# Patient Record
Sex: MALE | Race: BLACK OR AFRICAN AMERICAN | Employment: OTHER | URBAN - METROPOLITAN AREA
[De-identification: names, ages, dates, MRNs, and addresses within clinical notes are randomized per-mention and may not be internally consistent; named-entity substitution may affect disease eponyms.]

---

## 2019-06-13 ENCOUNTER — OFFICE VISIT (OUTPATIENT)
Dept: NEUROLOGY | Age: 44
End: 2019-06-13

## 2019-06-13 VITALS
DIASTOLIC BLOOD PRESSURE: 90 MMHG | OXYGEN SATURATION: 96 % | WEIGHT: 227.6 LBS | HEART RATE: 91 BPM | SYSTOLIC BLOOD PRESSURE: 132 MMHG | HEIGHT: 76 IN | BODY MASS INDEX: 27.72 KG/M2 | TEMPERATURE: 99.1 F | RESPIRATION RATE: 22 BRPM

## 2019-06-13 DIAGNOSIS — G93.49 ENCEPHALOPATHY CHRONIC: Primary | ICD-10-CM

## 2019-06-13 RX ORDER — ERGOCALCIFEROL 1.25 MG/1
CAPSULE ORAL
COMMUNITY
Start: 2019-05-30

## 2019-06-13 RX ORDER — AMLODIPINE BESYLATE 5 MG/1
TABLET ORAL
COMMUNITY
Start: 2019-05-29

## 2019-06-13 RX ORDER — BUPROPION HYDROCHLORIDE 300 MG/1
TABLET ORAL
COMMUNITY
Start: 2019-06-12 | End: 2021-07-14

## 2019-06-13 RX ORDER — ATORVASTATIN CALCIUM 10 MG/1
TABLET, FILM COATED ORAL
COMMUNITY
Start: 2019-05-29

## 2019-06-13 NOTE — LETTER
6/13/19 Patient: Mainor Acevedo YOB: 1975 Date of Visit: 6/13/2019 Ethel Gaines MD 
2020 [address] [facility] [address] [place] [address] VIA Facsimile: [phone] Dear Ethel Gaines MD, Thank you for referring Mr. Mainor Aceevdo to [facility] for evaluation. My notes for this consultation are attached. If you have questions, please do not hesitate to call me. I look forward to following your patient along with you.  
 
 
Sincerely, 
 
Yari Ba MD

## 2019-06-13 NOTE — PROGRESS NOTES
Diego Serna is a 53 y.o. male new patient in today with spouse to discuss encephalitis; referred by Angela Hinkle MD.

## 2019-06-17 ENCOUNTER — TELEPHONE (OUTPATIENT)
Dept: NEUROLOGY | Age: 44
End: 2019-06-17

## 2019-06-17 DIAGNOSIS — F40.240 CLAUSTROPHOBIA: Primary | ICD-10-CM

## 2019-06-17 RX ORDER — ALPRAZOLAM 0.5 MG/1
0.5 TABLET ORAL 2 TIMES DAILY
Qty: 6 TAB | Refills: 0 | Status: SHIPPED | OUTPATIENT
Start: 2019-06-17 | End: 2020-09-01

## 2019-06-17 NOTE — TELEPHONE ENCOUNTER
Pt spouse request anxiety medication due to patients fear of closed in areas. Pt spouse advised that script would have to be picked up from office to take to pharmacy. Please advise.

## 2019-06-17 NOTE — TELEPHONE ENCOUNTER
Pt spouse returned call to office wstating that she is going to cancel MRI. States that pt is having a \"fit\".  Please advise as needed

## 2019-07-12 ENCOUNTER — HOSPITAL ENCOUNTER (OUTPATIENT)
Dept: MRI IMAGING | Age: 44
Discharge: HOME | End: 2019-07-12
Attending: PSYCHIATRY & NEUROLOGY
Payer: COMMERCIAL

## 2019-07-12 VITALS — BODY MASS INDEX: 26.78 KG/M2 | WEIGHT: 220 LBS

## 2019-07-12 DIAGNOSIS — G93.49 ENCEPHALOPATHY CHRONIC: ICD-10-CM

## 2019-07-12 LAB — CREAT UR-MCNC: 1 MG/DL (ref 0.6–1.3)

## 2019-07-12 PROCEDURE — 74011636320 HC RX REV CODE- 636/320: Performed by: PSYCHIATRY & NEUROLOGY

## 2019-07-12 PROCEDURE — A9575 INJ GADOTERATE MEGLUMI 0.1ML: HCPCS | Performed by: PSYCHIATRY & NEUROLOGY

## 2019-07-12 PROCEDURE — 82565 ASSAY OF CREATININE: CPT

## 2019-07-12 PROCEDURE — 70553 MRI BRAIN STEM W/O & W/DYE: CPT

## 2019-07-12 RX ADMIN — GADOTERATE MEGLUMINE 20 ML: 376.9 INJECTION INTRAVENOUS at 12:23

## 2019-07-30 ENCOUNTER — HOSPITAL ENCOUNTER (OUTPATIENT)
Dept: NEUROLOGY | Age: 44
Discharge: HOME | End: 2019-07-30
Attending: PSYCHIATRY & NEUROLOGY
Payer: COMMERCIAL

## 2019-07-30 DIAGNOSIS — G93.49 ENCEPHALOPATHY CHRONIC: ICD-10-CM

## 2019-07-30 PROCEDURE — 95819 EEG AWAKE AND ASLEEP: CPT

## 2019-08-03 NOTE — PROCEDURES
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 He apparently was hospitalized for a month at that time. MEDICATIONS:  Currently Include Wellbutrin, Norvasc, Lipitor, Fioricet, vitamin D.    EEG REPORT:  The predominant apparently wakeful background consists of 5-15 microvolts, sinusoidal and symmetrical, 9-10 Hz waves which attenuate well with eye opening. Bifrontal 3-5 microvolts, 16-20 Hz waves are seen, which are symmetrical in their occurrence. Step flash photic stimulation was performed that caused driving between 3 and 18 flashes per second. Hyperventilation did not change the recording. IMPRESSION:  This is a normal wakeful EEG. No epileptiform or focal abnormality was identified.         MD LEYDA [id number]  D:  2019 15:02  T:  2019 21:34  JOB #:  [id number]

## 2019-12-23 ENCOUNTER — TELEPHONE (OUTPATIENT)
Dept: NEUROLOGY | Age: 44
End: 2019-12-23

## 2019-12-23 DIAGNOSIS — F02.80 ALZHEIMER'S DEMENTIA WITHOUT BEHAVIORAL DISTURBANCE, UNSPECIFIED TIMING OF DEMENTIA ONSET: Primary | ICD-10-CM

## 2019-12-23 DIAGNOSIS — G30.9 ALZHEIMER'S DEMENTIA WITHOUT BEHAVIORAL DISTURBANCE, UNSPECIFIED TIMING OF DEMENTIA ONSET: Primary | ICD-10-CM

## 2019-12-23 NOTE — TELEPHONE ENCOUNTER
Pt's wife called to schedule an appt with Dr. Altman . However, referral generated by Dr. Amanda Perla has been closed.  Please create new referral.
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
## 2020-02-27 ENCOUNTER — OFFICE VISIT (OUTPATIENT)
Dept: NEUROLOGY | Age: 45
End: 2020-02-27

## 2020-02-27 DIAGNOSIS — F81.9 LEARNING DIFFICULTY DUE TO COGNITIVE LIMITATIONS: ICD-10-CM

## 2020-02-27 DIAGNOSIS — R47.89 WORD FINDING DIFFICULTY: ICD-10-CM

## 2020-02-27 DIAGNOSIS — R41.9 DEFICIT IN COMPREHENSION: ICD-10-CM

## 2020-02-27 DIAGNOSIS — R41.840 ATTENTION AND CONCENTRATION DEFICIT: ICD-10-CM

## 2020-02-27 DIAGNOSIS — R41.844 EXECUTIVE FUNCTION DEFICIT: ICD-10-CM

## 2020-02-27 DIAGNOSIS — R41.3 MEMORY LOSS: Primary | ICD-10-CM

## 2020-03-13 ENCOUNTER — OFFICE VISIT (OUTPATIENT)
Dept: NEUROLOGY | Age: 45
End: 2020-03-13

## 2020-03-13 DIAGNOSIS — F43.22 ADJUSTMENT DISORDER WITH ANXIOUS MOOD: ICD-10-CM

## 2020-03-13 DIAGNOSIS — G31.84 MILD NEUROCOGNITIVE DISORDER: Primary | ICD-10-CM
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
## 2020-03-26 ENCOUNTER — OFFICE VISIT (OUTPATIENT)
Dept: NEUROLOGY | Age: 45
End: 2020-03-26

## 2020-03-26 DIAGNOSIS — G31.84 MILD NEUROCOGNITIVE DISORDER: Primary | ICD-10-CM

## 2020-03-26 DIAGNOSIS — F43.22 ADJUSTMENT DISORDER WITH ANXIOUS MOOD: ICD-10-CM

## 2020-03-26 NOTE — PROGRESS NOTES
Interactive Psychotherapy/office feedback        Interactive office feedback session with Mr. Karen George and his wife. I reviewed the results of the recent Neuropsychological Evaluation  including the observed areas of neurocognitive strengths and weaknesses. Education was provided regarding my diagnostic impressions, and treatment plan/options were discussed. I also answered numerous questions related to the clinical findings, including the various methods to improve cognition and mood. CBT, psychoeducation, and supportive psychotherapy techniques were utilized. Referrals were provided (Dr. Farhan Ardon). Prior to seeing the patient I reviewed the records, including the previously completed report, the records in [place], and any updated visits from other providers since I saw the patient last.       Diagnoses:      1. MCI  2. Adjustment Disorder, with anxious features                The patient will follow up with the referring provider, and reported being very pleased with the services provided. Follow up with [facility] prn. [id number] psychotherapy with patient and wife. 45 minutes       This note will not be viewable in "[employer]t. This note was created using voice recognition software. Despite editing, there may be syntax errors.

## 2020-04-02 ENCOUNTER — VIRTUAL VISIT (OUTPATIENT)
Dept: NEUROLOGY | Age: 45
End: 2020-04-02

## 2020-04-02 VITALS — BODY MASS INDEX: 29.22 KG/M2 | HEIGHT: 76 IN | WEIGHT: 240 LBS

## 2020-04-02 DIAGNOSIS — R41.3 MEMORY LOSS: ICD-10-CM

## 2020-04-02 DIAGNOSIS — G93.49 ENCEPHALOPATHY CHRONIC: ICD-10-CM

## 2020-04-02 DIAGNOSIS — G30.9 ALZHEIMER'S DEMENTIA WITHOUT BEHAVIORAL DISTURBANCE, UNSPECIFIED TIMING OF DEMENTIA ONSET: ICD-10-CM

## 2020-04-02 DIAGNOSIS — F43.22 ADJUSTMENT DISORDER WITH ANXIOUS MOOD: ICD-10-CM

## 2020-04-02 DIAGNOSIS — F02.80 ALZHEIMER'S DEMENTIA WITHOUT BEHAVIORAL DISTURBANCE, UNSPECIFIED TIMING OF DEMENTIA ONSET: ICD-10-CM

## 2020-04-02 DIAGNOSIS — G31.84 MILD NEUROCOGNITIVE DISORDER: Primary | ICD-10-CM

## 2020-04-02 RX ORDER — DIVALPROEX SODIUM 500 MG/1
TABLET, EXTENDED RELEASE ORAL
COMMUNITY
Start: 2020-03-28 | End: 2021-07-14

## 2020-04-02 RX ORDER — DONEPEZIL HYDROCHLORIDE 5 MG/1
5 TABLET, FILM COATED ORAL
Qty: 30 TAB | Refills: 3 | Status: SHIPPED | OUTPATIENT
Start: 2020-04-02 | End: 2020-07-09

## 2020-04-02 NOTE — PROGRESS NOTES
Re:  Henna Shock up visit     4/2/2020 10:31 AM    SSN: [id number]    Subjective:   Roxanna Powers is seen virtually for follow up of his cognitive problems after an episode of encephalitis. Medications:    Current Outpatient Medications   Medication Sig Dispense Refill    divalproex ER (DEPAKOTE ER) 500 mg ER tablet       buPROPion XL (WELLBUTRIN XL) 300 mg XL tablet       amLODIPine (NORVASC) 5 mg tablet       atorvastatin (LIPITOR) 10 mg tablet       ergocalciferol (ERGOCALCIFEROL) 50,000 unit capsule       ALPRAZolam (XANAX) 0.5 mg tablet Take 1 Tab by mouth two (2) times a day. Max Daily Amount: 1 mg. Start 2 days prior to MRI 6 Tab 0    butalbital-acetaminophen-caffeine (FIORICET) -40 mg per tablet Take 1 Tab by mouth every six (6) hours as needed for Headache. 16 Tab 0       Vital signs:    Visit Vitals  Ht 6' 4\" (1.93 m)   Wt 108.9 kg (240 lb)   BMI 29.21 kg/m²       Review of Systems:   As above otherwise 11 point review of systems negative including;   Constitutional no fever or chills  Skin denies rash or itching  HEENT  Denies tinnitus, hearing lose  Eyes denies diplopia vision lose  Respiratory denies sortness of breath  Cardiovascular denies chest pain, dyspnea on exertion  Gastrointestinal denies nausea, vomiting, diarrhea, constipation  Genitourinary denies incontinence  Musculoskeletal denies joint pain or swelling  Endocrine denies weight change  Hematology denies easy bruising or bleeding   Neurological as above in HPI      There is no problem list on file for this patient. Objective: The patient is awake, alert, and oriented x 4. Fund of knowledge is adequate. Speech is fluent and memory is intact. Cranial Nerves: II  Visual fields are full to confrontation. III, IV, VI  Extraocular movements are intact. There is no nystagmus. V  Facial sensation is intact to pinprick. VII  Face is symmetrical.  VIII - Hearing is present.   IX, X, XII  Palate is symmetrical.   XI - Shoulder shrugging and head turning intact  Motor: The patient moves all four limbs fairly well and symmetrically. Tone is normal. Reflexes are 2+ and symmetrical. Plantars are down going. Gait is normal.    CBC:   Lab Results   Component Value Date/Time    WBC 4.8 12/14/2015 11:35 PM    RBC 4.59 (L) 12/14/2015 11:35 PM    HGB 14.2 12/14/2015 11:35 PM    HCT 42.8 12/14/2015 11:35 PM    PLATELET 891 39/76/3846 11:35 PM     BMP:   Lab Results   Component Value Date/Time    Glucose 109 (H) 12/14/2015 11:35 PM    Sodium 141 12/14/2015 11:35 PM    Potassium 3.7 12/14/2015 11:35 PM    Chloride 105 12/14/2015 11:35 PM    CO2 25 12/14/2015 11:35 PM    BUN 15 12/14/2015 11:35 PM    Creatinine 1.00 12/14/2015 11:35 PM    Calcium 8.4 (L) 12/14/2015 11:35 PM     CMP:   Lab Results   Component Value Date/Time    Glucose 109 (H) 12/14/2015 11:35 PM    Sodium 141 12/14/2015 11:35 PM    Potassium 3.7 12/14/2015 11:35 PM    Chloride 105 12/14/2015 11:35 PM    CO2 25 12/14/2015 11:35 PM    BUN 15 12/14/2015 11:35 PM    Creatinine 1.00 12/14/2015 11:35 PM    Calcium 8.4 (L) 12/14/2015 11:35 PM    Anion gap 11 12/14/2015 11:35 PM    BUN/Creatinine ratio 15 12/14/2015 11:35 PM     Coagulation: No results found for: PTP, INR, APTT, PTTT, INREXT  Cardiac markers: No results found for: CPK, CKND1, MELISA    Assessment:  Cognitive decline in this man who has risk factors including episode of sever encephilitis. The neuro psych testing points toward a non-specific cognitive decline, etiology almost certainly his encephalitis. Plan: Will start him on low dose Aricept. RTC in 3 weeks    Sincerely,        Katherine Damian. Devora Carson M.D. Consent: Hong Phillips, who was seen by synchronous (real-time) audio-video technology, and/or his healthcare decision maker, is aware that this patient-initiated, Telehealth encounter on 4/2/2020 is a billable service, with coverage as determined by his insurance carrier.  He is aware that he may receive a bill and has provided verbal consent to proceed: Yes. I spent at least 25 minutes with this established patient, and >50% of the time was spent counseling and/or coordinating care regarding results of neuropsych testing and continued trouble with cognitive problems. drug therapy. 712  Subjective:   Patsy Johns is a 50 y.o. male who was seen for Referral Follow Up (Dr. Sujatha Gaspar)    We discussed the expected course, resolution and complications of the diagnosis(es) in detail. Medication risks, benefits, costs, interactions, and alternatives were discussed as indicated. I advised him to contact the office if his condition worsens, changes or fails to improve as anticipated. He expressed understanding with the diagnosis(es) and plan. Patsy Johns is a 50 y.o. male being evaluated by a video visit encounter for concerns as above. A caregiver was present when appropriate. Due to this being a TeleHealth encounter ([facility]), evaluation of the following organ systems was limited: Vitals/Constitutional/EENT/Resp/CV/GI//MS/Neuro/Skin/Heme-Lymph-Imm. Pursuant to the emergency declaration under the [facility] [place]note 2[place], [facility]5 waiver authority and the [employer] General Act, this Virtual  Visit was conducted, with patient's (and/or legal guardian's) consent, to reduce the patient's risk of exposure to COVID-19 and provide necessary medical care. Services were provided through a video synchronous discussion virtually to substitute for in-person clinic visit. Patient and provider were located at their individual homes.         Rhonda Charles MD

## 2020-07-09 ENCOUNTER — VIRTUAL VISIT (OUTPATIENT)
Dept: NEUROLOGY | Age: 45
End: 2020-07-09

## 2020-07-09 DIAGNOSIS — G93.49 ENCEPHALOPATHY CHRONIC: ICD-10-CM

## 2020-07-09 DIAGNOSIS — R26.9 GAIT DISORDER: ICD-10-CM

## 2020-07-09 DIAGNOSIS — R26.89 IMBALANCE: ICD-10-CM

## 2020-07-09 DIAGNOSIS — R41.3 MEMORY LOSS: Primary | ICD-10-CM

## 2020-07-09 DIAGNOSIS — G31.84 MILD NEUROCOGNITIVE DISORDER: ICD-10-CM

## 2020-07-09 RX ORDER — DONEPEZIL HYDROCHLORIDE 10 MG/1
10 TABLET, FILM COATED ORAL
Qty: 30 TAB | Refills: 5 | Status: SHIPPED | OUTPATIENT
Start: 2020-07-09 | End: 2020-09-01 | Stop reason: SDUPTHER

## 2020-07-09 NOTE — PROGRESS NOTES
Diego Archibald is a 50 y.o. male who was seen by synchronous (real-time) audio-video technology on 7/9/2020 for Neurologic Problem (mild neurocognitive disorder)        Assessment & Plan:   Diagnoses and all orders for this visit:    1. Memory loss  -     VITAMIN B12; Future  -     donepeziL (ARICEPT) 10 mg tablet; Take 1 Tab by mouth nightly. -     METABOLIC PANEL, COMPREHENSIVE; Future  -     CBC W/O DIFF; Future  -     AMMONIA; Future  -     REFERRAL TO NEUROLOGY    2. Mild neurocognitive disorder  -     VITAMIN B12; Future  -     donepeziL (ARICEPT) 10 mg tablet; Take 1 Tab by mouth nightly. -     METABOLIC PANEL, COMPREHENSIVE; Future  -     CBC W/O DIFF; Future  -     AMMONIA; Future  -     REFERRAL TO PHYSICAL THERAPY  -     REFERRAL TO NEUROLOGY    3. Encephalopathy chronic  -     METABOLIC PANEL, COMPREHENSIVE; Future  -     CBC W/O DIFF; Future  -     AMMONIA; Future  -     REFERRAL TO PHYSICAL THERAPY  -     REFERRAL TO NEUROLOGY    4. Gait disorder  -     REFERRAL TO PHYSICAL THERAPY    5. Imbalance  -     REFERRAL TO PHYSICAL THERAPY      This is a 58-year-old male who presents in follow-up for impaired cognition after an episode of encephalopathy in October 2018 in which the etiology was not entirely clear. During that episode he had empiric IV steroids and empiric IVIG with reported improvement in his mental status. There have been some improvements noted since starting Aricept. At this time will increase Aricept to 10 mg daily. Will obtain updated labs including CBC, CMP, ammonia, vitamin B12 level. Will refer for sleep evaluation due to the sleep concerns as well as cognitive complaints. Will refer to physical therapy due to his gait imbalance and mobility issues and an assistive device can be considered. He is seeing psychiatry.   Social work will be considered for interventions for community-based resources and subsidies based on recommendation after neuropsych evaluation, and they would like to do this when they get a new place. Follow up in 3 months. I spent at least 30 minutes on this visit with this established patient. Subjective:     Jocy Mckee presents in follow-up. He was initially seen here in June 2019 by Dr. Erick Moritz for follow-up of encephalitis suffered in October 2018. It was noted that he was hospitalized for 1 month in [place]. It was noted that he became confused and was found unconscious at the time of admission. It was noted in the history that there was no definitive organism told to his wife, but he was diagnosed on the basis of a spinal tap. The tap was benign except showing an elevated protein of 227. The numerous CSF studies were reported to be normal.  There were no notes seen at that time. He was in the hospital for a month. After discharge from the hospital she is noted him to be very psychomotor slow. He is slow to respond and does not appear to be caring for himself like brushing his teeth, showering, cooking, etc.  This is a dramatic decrease in his functional capacity since the incident back in 2018. He is unable to travel on the bus alone. He asked like a child at times. There was an MRI from October 2018 which reported residual of remote hemorrhagic infarct left anterior basal ganglia as well as mild to moderate generalized volume loss and moderate burden of nonspecific supratentorial white matter changes. It was noted that he did fairly well on the MMSE at his initial visit but appeared to be psychomotor slow. He was to have a repeat MRI and EEG and was sent for neuropsych testing. EEG in July 2019 was a normal wakeful EEG. MRI in June 2019 reported chronic left basal ganglia infarct with hemosiderin staining from associated hemorrhage, and mild to moderate amount of white matter diseas. He underwent neuropsych testing with Dr. Herbert Gibson in March 2020. He was next seen here in April 2020 by Dr. Erick Moritz.   It was indicated that he has cognitive decline with risk factor including episode of severe encephalitis and that the neuropsych testing points to a nonspecific cognitive decline, etiology almost certainly his encephalitis. He was started on a low-dose of Aricept. He presents today in follow-up via virtual video visit. He is accompanied by his wife. He started the Aricept 5 mg nightly. She believes that there is some benefit; he seems to be remembering personal hygiene a little bit better. He is still moving a little slowly. He is not able to cook for himself. He is not able to prepare food. His wife has to take time off work to make sure that he eats. He is able to do simple tasks like fixing the bed do more complex tasks in the whole room. He is given one thing to do at a time because he is not able to process more. He is not able to walk in a straight line. His balance is off. She is wondering if he needs a walker or a cane. Concerned about him going up and down the stairs and is thinking about getting a 1 level home. He had a couple of falls down the stairs since his hospitalization; they were in March of last year in January of this year. She reports that he has never had physical therapy. He has a history of low normal vitamin B12 level and per chart review in Care Everywhere he had injections. Per the neurology notes in Care Everywhere from the October 2018 admission, it was noted that this was an acute unspecified encephalopathy, suspected autoimmune. He presented on 10/1/2018 after being found down in the field. The initial concern was for possible toxic or traumatic insult on day, however persistent AMS greater than 2 weeks after presentation makes these unlikely. EEGs on 10/3/2018 and 10/10/2018 were both normal.  Repeat MRI of the brain with contrast on 10/11/2018 reported no enhancing abnormalities or other acute findings.   CSF protein elevated at 227 on 10/5/2018 raising the concern for a CNS inflammatory process such as autoimmune encephalitis. He received empiric IV steroids and IVIG treatments. He was getting vitamin B12 1000 mcg IM injections and was also started on vitamin D. Was on thiamine and low-dose lactulose. He had a low normal vitamin B12 and slightly elevated repeat ammonia but ammonia was normal on admission. It was reported on the neurology note on 10/22/2018 that he was 90% back to baseline. There were subcortical white matter abnormalities on MRI and new chronic left basal ganglia infarct. History of hypertension and smoking. Risk factors including vitamin B12 and D deficiencies and history of EtOH abuse. She reports that he has been sleepwalking, constantly getting up, not sleeping at night gets up and leaves the house and does not tell anybody. He sleeps very light. Reports no recent sleepwalking. Reports no alcohol or drug use currently except smokes a black and mild every now and then. Last vitamin B12 level was 250 on 10/4/2018. He is taking vitamin D. Was taking a multivitamin but not in a while. TSH was normal in October 2018 and they were told it was normal last year. Denies any unilateral weakness. Reports he has had incontinence. Reports that he went to cognitive behavioral therapy in [place]. They have a nurse practitioner in psychiatry. They went for initial visit at [facility] on July 23, previously went to another office but they are no longer there. He is on Depakote for mood swings. She reports that he has anger issues. Regarding his sleep she reports that he worries about his mother who is living at the home. Prior to Admission medications    Medication Sig Start Date End Date Taking? Authorizing Provider   donepeziL (ARICEPT) 10 mg tablet Take 1 Tab by mouth nightly.  7/9/20  Yes Eleni Doherty NP   divalproex ER (DEPAKOTE ER) 500 mg ER tablet  3/28/20  Yes Provider, Historical   atorvastatin (LIPITOR) 10 mg tablet  5/29/19  Yes Provider, Historical ALPRAZolam (XANAX) 0.5 mg tablet Take 1 Tab by mouth two (2) times a day. Max Daily Amount: 1 mg. Start 2 days prior to MRI 6/17/19   Chidi Way MD   buPROPion XL (WELLBUTRIN XL) 300 mg XL tablet  6/12/19   Provider, Historical   amLODIPine (NORVASC) 5 mg tablet  5/29/19   Provider, Historical   ergocalciferol (ERGOCALCIFEROL) 50,000 unit capsule  5/30/19   Provider, Historical   butalbital-acetaminophen-caffeine (FIORICET) -40 mg per tablet Take 1 Tab by mouth every six (6) hours as needed for Headache. 12/15/15   LYNDA Morgan     There is no problem list on file for this patient. Current Outpatient Medications   Medication Sig Dispense Refill    donepeziL (ARICEPT) 10 mg tablet Take 1 Tab by mouth nightly. 30 Tab 5    divalproex ER (DEPAKOTE ER) 500 mg ER tablet       atorvastatin (LIPITOR) 10 mg tablet       ALPRAZolam (XANAX) 0.5 mg tablet Take 1 Tab by mouth two (2) times a day. Max Daily Amount: 1 mg. Start 2 days prior to MRI 6 Tab 0    buPROPion XL (WELLBUTRIN XL) 300 mg XL tablet       amLODIPine (NORVASC) 5 mg tablet       ergocalciferol (ERGOCALCIFEROL) 50,000 unit capsule       butalbital-acetaminophen-caffeine (FIORICET) -40 mg per tablet Take 1 Tab by mouth every six (6) hours as needed for Headache. 16 Tab 0     Allergies   Allergen Reactions    Penicillin Other (comments)     HA    Pcn [Penicillins] Other (comments)     headache     No past medical history on file. No past surgical history on file. No family history on file. Social History     Tobacco Use    Smoking status: Current Some Day Smoker     Types: Cigars    Smokeless tobacco: Never Used    Tobacco comment: \"black and mild\"   Substance Use Topics    Alcohol use:  Yes     Alcohol/week: 2.0 standard drinks     Types: 2 Cans of beer per week       ROS  GENERAL: Denies fever or fatigue  CARDIAC: No CP or SOB  PULMONARY: No cough or SOB  MUSCULOSKELETAL: No new joint pain  NEURO: SEE HPI      Objective:     Patient-Reported Vitals 7/9/2020   Patient-Reported Weight 200lb   Patient-Reported Height 6'4        Constitutional: [x] Appears well-developed and well-nourished [x] No apparent distress      [] Abnormal -     Mental status: [x] Alert and awake  [] Oriented to person/place/time [] Able to follow commands    [x] Abnormal - Oriented to self, place, year as 2020, thinks month is June or July. Recalls 3/3 words at 1 min. Follows simple one-step commands. Wife provides history. Eyes:   EOM    [x]  Normal    [] Abnormal -   Sclera  []  Normal    [] Abnormal -          Discharge []  None visible   [] Abnormal -     HENT: [x] Normocephalic, atraumatic  [] Abnormal -   [] Mouth/Throat: Mucous membranes are moist    External Ears [] Normal  [] Abnormal -    Neck: [] No visualized mass [] Abnormal -     Pulmonary/Chest: [x] Respiratory effort normal   [x] No visualized signs of difficulty breathing or respiratory distress        [] Abnormal -      Musculoskeletal:   [] Normal gait with no signs of ataxia         [x] Normal range of motion of neck        [x] Abnormal - Gait as below. Neurological:        [x] No Facial Asymmetry (Cranial nerve 7 motor function) (limited exam due to video visit). Tongue is midline. Able to move all extremities antigravity. Does not participate in ankle DF or foot tapping to assess for foot drop. Gait is a little wide based, slow. [x] No gaze palsy        [] Abnormal -          Skin:        [] No significant exanthematous lesions or discoloration noted on facial skin         [] Abnormal -            Psychiatric:       [x] Normal Affect [] Abnormal -        [] No Hallucinations    Other pertinent observable physical exam findings:-        We discussed the expected course, resolution and complications of the diagnosis(es) in detail. Medication risks, benefits, costs, interactions, and alternatives were discussed as indicated.   I advised him to contact the office if his condition worsens, changes or fails to improve as anticipated. He expressed understanding with the diagnosis(es) and plan. Darlin Hunter, who was evaluated through a patient-initiated, synchronous (real-time) audio-video encounter, and/or his healthcare decision maker, is aware that it is a billable service, with coverage as determined by his insurance carrier. He provided verbal consent to proceed: Yes, and patient identification was verified. It was conducted pursuant to the emergency declaration under the [address] [place] [address], [address] [place] [address] authority and the [employer] General Act. A caregiver was present when appropriate. Ability to conduct physical exam was limited. I was in the office. The patient was at home.       Vinicio Fields NP

## 2020-07-09 NOTE — PROGRESS NOTES
Castro Sung is a 50 y.o. male patienton virtual visit today for follow-up on mild neurocognitive disorder. Visit assisted by spouse. 1. Have you been to the ER, urgent care clinic since your last visit? Hospitalized since your last visit? No    2. Have you seen or consulted any other health care providers outside of the [address] [place] [address] since your last visit? Include any pap smears or colon screening. No    Mobile number [phone] will be used for today's visit.

## 2020-09-01 ENCOUNTER — VIRTUAL VISIT (OUTPATIENT)
Dept: NEUROLOGY | Age: 45
End: 2020-09-01

## 2020-09-01 DIAGNOSIS — R26.9 GAIT DISORDER: ICD-10-CM

## 2020-09-01 DIAGNOSIS — G31.84 MILD NEUROCOGNITIVE DISORDER: Primary | ICD-10-CM

## 2020-09-01 DIAGNOSIS — G93.49 ENCEPHALOPATHY CHRONIC: ICD-10-CM

## 2020-09-01 DIAGNOSIS — R26.89 IMBALANCE: ICD-10-CM

## 2020-09-01 DIAGNOSIS — R41.3 MEMORY LOSS: ICD-10-CM

## 2020-09-01 RX ORDER — DONEPEZIL HYDROCHLORIDE 10 MG/1
10 TABLET, FILM COATED ORAL
Qty: 30 TAB | Refills: 5 | Status: SHIPPED | OUTPATIENT
Start: 2020-09-01 | End: 2021-01-29 | Stop reason: SDUPTHER

## 2020-09-01 NOTE — PROGRESS NOTES
Neville Alejandro is a 50 y.o. male who was seen by synchronous (real-time) audio-video technology on 9/1/2020 for Memory Loss (mild cognitive disorder)        Assessment & Plan:   Diagnoses and all orders for this visit:    1. Mild neurocognitive disorder  -     TSH AND FREE T4; Future    2. Memory loss    3. Encephalopathy chronic    4. Gait disorder    5. Imbalance      This is a 57-year-old male who presents in follow-up for impaired cognition after an episode of encephalopathy October 2018 in which the etiology was not entirely clear. During that episode he had empiric IV steroids and empiric IVIG with reported improvement in his mental status. He has undergone neuropsych evaluation in March 2020 with the diagnoses of mild cognitive impairment and adjustment disorder with anxious features. There has been some improvements noted since starting Aricept. They noticed benefit with the dose of 10 mg daily. Continue Aricept. Labs pending including CBC, CMP, ammonia, vitamin B12 level, and add thyroid function tests. They would like the lab slips sent to them. Sleep evaluation is pending, we will look into this referral.  He continues with psychiatry. Recommended to follow-up with PCP as well and discussed management of vascular risk factors including hypertension and hyperlipidemia. Follow up in 3 months. I spent at least 30 minutes on this visit with this established patient. Subjective:     Neville Alejandro presents in follow-up for cognitive changes after an episode of encephalopathy in October 2018. He was hospitalized for 1 month in [facility]. Since that episode there was a dramatic decrease in his functional capacity. It was noted that he appeared to be psychomotor slow. His last MRI of the brain was from June 2019 which reported chronic left basal ganglia infarct with hemosiderin staining from associated hemorrhage, and mild to moderate amount of white matter disease.   EEG in July 2019 was normal.  He underwent neuropsych testing in March 2020. It was noted that the cognitive decline was nonspecific with etiology pointing to his episode of encephalitis. He started on a low-dose of Aricept thereafter. He was last seen here on 7/9/2020. At that time it was noted that there will have been some improvements since starting Aricept. The Aricept was increased to 10 mg daily. Labs were to be obtained including CBC, CMP, ammonia, vitamin B12 level. He was referred for sleep evaluations due to the sleep concerns as well as cognitive complaints. He was referred to physical therapy due to his gait imbalance and mobility issues and an assistive device can be considered. He continues to see psychiatry. Social work was going to be considered due to recommendation after neuropsych evaluation, and they noted they would like to do this when they get a new place. He presents today in follow-up. His wife notes that the Aricept is helping. He is remembering personal hygiene better, remembering to eat at least once a day without reminders. He still has a problem she notes with his organization skills, she has to go behind him to clean up for example. There is also a problem with focus or inattention, such as when having a conversation he cannot focus on the subject matter at hand, starts talking about something else. They have been following with psychiatry. He is on the Depakote for mood swings. She believes the Aricept increased dose has been helpful. Denies side effects on the Aricept. She notes that he remembers to take his medications on his own. Denies new concerns. They had difficulty getting to physical therapy due to car problems and also his wife has an injury. He denies problems with mobility, says he walks to the store. He goes on walks daily. Denies falls. He has stopped smoking. Prior to Admission medications    Medication Sig Start Date End Date Taking?  Authorizing Provider   donepeziL (ARICEPT) 10 mg tablet Take 1 Tab by mouth nightly. 7/9/20  Yes Chi Santos NP   divalproex ER (DEPAKOTE ER) 500 mg ER tablet  3/28/20  Yes Provider, Historical   amLODIPine (NORVASC) 5 mg tablet  5/29/19  Yes Provider, Historical   atorvastatin (LIPITOR) 10 mg tablet  5/29/19  Yes Provider, Historical   ALPRAZolam (XANAX) 0.5 mg tablet Take 1 Tab by mouth two (2) times a day. Max Daily Amount: 1 mg. Start 2 days prior to MRI 6/17/19 9/1/20  Navarro Garvin MD   buPROPion XL (WELLBUTRIN XL) 300 mg XL tablet  6/12/19   Provider, Historical   ergocalciferol (ERGOCALCIFEROL) 50,000 unit capsule  5/30/19   Provider, Historical   butalbital-acetaminophen-caffeine (FIORICET) -40 mg per tablet Take 1 Tab by mouth every six (6) hours as needed for Headache. 12/15/15 9/1/20  LYNDA Chatman     There is no problem list on file for this patient. Current Outpatient Medications   Medication Sig Dispense Refill    donepeziL (ARICEPT) 10 mg tablet Take 1 Tab by mouth nightly. 30 Tab 5    divalproex ER (DEPAKOTE ER) 500 mg ER tablet       amLODIPine (NORVASC) 5 mg tablet       atorvastatin (LIPITOR) 10 mg tablet       buPROPion XL (WELLBUTRIN XL) 300 mg XL tablet       ergocalciferol (ERGOCALCIFEROL) 50,000 unit capsule        Allergies   Allergen Reactions    Penicillin Other (comments)     HA    Pcn [Penicillins] Other (comments)     headache     No past medical history on file. No past surgical history on file. No family history on file. Social History     Tobacco Use    Smoking status: Current Some Day Smoker     Types: Cigars    Smokeless tobacco: Never Used    Tobacco comment: \"black and mild\"   Substance Use Topics    Alcohol use:  Yes     Alcohol/week: 2.0 standard drinks     Types: 2 Cans of beer per week       ROS  GENERAL: Denies fever or fatigue  CARDIAC: No CP or SOB  PULMONARY: No cough or SOB  MUSCULOSKELETAL: No new joint pain  NEURO: SEE HPI      Objective: Patient-Reported Vitals 9/1/2020   Patient-Reported Weight 250lb   Patient-Reported Height -        Constitutional: [x] Appears well-developed and well-nourished [x] No apparent distress      [] Abnormal -     Mental status: [x] Alert and awake  [x] Oriented to person/place/time [x] Able to follow commands. Oriented to self, place, year as 2020, month, day of the week. Recalls 3/3 words at 1 min. [x] Abnormal - Follows simple one-step commands. Wife provides most of the history. Eyes:   EOM    [x]  Normal    [] Abnormal -   Sclera  [x]  Normal    [] Abnormal -          Discharge [x]  None visible   [] Abnormal -     HENT: [x] Normocephalic, atraumatic  [] Abnormal -   [] Mouth/Throat: Mucous membranes are moist    External Ears [] Normal  [] Abnormal -    Neck: [x] No visualized mass [] Abnormal -     Pulmonary/Chest: [x] Respiratory effort normal   [x] No visualized signs of difficulty breathing or respiratory distress        [] Abnormal -      Musculoskeletal:   [] Normal gait with no signs of ataxia         [x] Normal range of motion of neck        [x] Abnormal - Gait as below. Neurological:        [x] No Facial Asymmetry (Cranial nerve 7 motor function) (limited exam due to video visit). Tongue is midline. Able to move all extremities antigravity. Finger tapping symmetrical.        [x] No gaze palsy        [x] Abnormal - Does not participate in ankle DF or foot tapping to assess for foot drop. Gait is a little wide based, slow. Gait appears steady, less arm swing on left arm. Skin:        [x] No significant exanthematous lesions or discoloration noted on facial skin         [] Abnormal -            Psychiatric:       [x] Normal Affect [] Abnormal -        [x] No Hallucinations    Other pertinent observable physical exam findings:-        We discussed the expected course, resolution and complications of the diagnosis(es) in detail.   Medication risks, benefits, costs, interactions, and alternatives were discussed as indicated. I advised him to contact the office if his condition worsens, changes or fails to improve as anticipated. He expressed understanding with the diagnosis(es) and plan. Rima Davis, who was evaluated through a patient-initiated, synchronous (real-time) audio-video encounter, and/or his healthcare decision maker, is aware that it is a billable service, with coverage as determined by his insurance carrier. He provided verbal consent to proceed: Yes, and patient identification was verified. It was conducted pursuant to the emergency declaration under the [facility], [address] [place] [address] authority and the [employer] and [employer] General Act. A caregiver was present when appropriate. Ability to conduct physical exam was limited. I was at home. The patient was at home.       Craig Solorzano NP

## 2020-09-01 NOTE — PROGRESS NOTES
Neymar Colvin is a 50 y.o. male on virtual visit today for follow-up on memory loss and mild cognitive disorder. 1. Have you been to the ER, urgent care clinic since your last visit? Hospitalized since your last visit? No    2. Have you seen or consulted any other health care providers outside of the [address] [place] [address] since your last visit? Include any pap smears or colon screening. No    Mobile number for today's visit will be [phone].

## 2020-11-05 DIAGNOSIS — R41.3 MEMORY LOSS: ICD-10-CM

## 2020-11-05 DIAGNOSIS — G93.49 ENCEPHALOPATHY CHRONIC: ICD-10-CM

## 2020-11-05 DIAGNOSIS — G31.84 MILD NEUROCOGNITIVE DISORDER: Primary | ICD-10-CM

## 2021-01-29 ENCOUNTER — VIRTUAL VISIT (OUTPATIENT)
Dept: NEUROLOGY | Age: 46
End: 2021-01-29
Payer: COMMERCIAL

## 2021-01-29 DIAGNOSIS — R41.3 MEMORY LOSS: ICD-10-CM

## 2021-01-29 DIAGNOSIS — G31.84 MILD COGNITIVE IMPAIRMENT: Primary | ICD-10-CM

## 2021-01-29 DIAGNOSIS — G31.84 MILD NEUROCOGNITIVE DISORDER: ICD-10-CM

## 2021-01-29 DIAGNOSIS — I67.9 CEREBROVASCULAR DISEASE: ICD-10-CM

## 2021-01-29 PROCEDURE — 99214 OFFICE O/P EST MOD 30 MIN: CPT | Performed by: NURSE PRACTITIONER

## 2021-01-29 RX ORDER — DONEPEZIL HYDROCHLORIDE 10 MG/1
10 TABLET, FILM COATED ORAL
Qty: 30 TAB | Refills: 5 | Status: SHIPPED | OUTPATIENT
Start: 2021-01-29 | End: 2022-03-29

## 2021-01-29 NOTE — PROGRESS NOTES
Sonal Stockton is a 50 y.o. male who was seen by synchronous (real-time) audio-video technology on 1/29/2021 for Follow-up        Assessment & Plan:   Diagnoses and all orders for this visit:    1. Mild cognitive impairment  -     REFERRAL TO NEUROPSYCHOLOGY    2. Cerebrovascular disease    3. Memory loss  -     donepeziL (ARICEPT) 10 mg tablet; Take 1 Tab by mouth nightly. 4. Mild neurocognitive disorder  -     donepeziL (ARICEPT) 10 mg tablet; Take 1 Tab by mouth nightly. This is a 69-year-old male who presents in follow-up for mild cognitive impairment. He has impaired cognition after an episode of encephalopathy in October 18 in which the etiology was not entirely clear. During that episode he had empiric IV steroids and empiric IVIG with reported improvement in his mental status. He has undergone neuropsych evaluation in March 2020 with the diagnoses of mild cognitive impairment and adjustment disorder with anxious features. There have been some improvements noted since starting Aricept. He currently continues this at 10 mg daily. He seems to be improving over time and they note that their new living situation is helpful. He is taking Depakote for mood and follows with psychiatry. He went to therapy that was recommended after the neuropsych evaluation which they did not find beneficial.  There were labs ordered at a previous visit and we will provide the lab slip to have these done. We will repeat the neuropsych evaluation at about 1 year from prior, which will be in March. We reviewed the results of the MRI brain that was done previously and encouraged vascular risk factor management such as management of hypertension, hyperlipidemia, and smoking cessation. Follow up after neuropsych evaluation. I spent at least 35 minutes on this visit with this established patient. Subjective:     Sonal Stockton presents in follow-up for cognitive changes after an episode of encephalopathy in October 2018. He was hospitalized for 1 month in [place]. Since that episode there was a dramatic decrease in his functional capacity. It was noted that he appeared to be psychomotor slow. His last MRI of the brain was from June 2019 which reported chronic left basal ganglia infarct with hemosiderin staining from associated hemorrhage, and mild to moderate amount of white matter disease. EEG in July 2019 was normal.  He underwent neuropsych testing in March 2020. It was noted that the cognitive decline was nonspecific with etiology pointing to his episode of encephalitis. He started on a low-dose of Aricept thereafter. At the visit on 7/9/2020, it was noted that there will have been some improvements since starting Aricept. The Aricept was increased to 10 mg daily. Labs were to be obtained including CBC, CMP, ammonia, vitamin B12 level. He was referred for sleep evaluation due to the sleep concerns as well as cognitive complaints. He was referred to physical therapy due to his gait imbalance and mobility issues and an assistive device can be considered. He continues to see psychiatry. Social work was going to be considered due to recommendation after neuropsych evaluation, and they noted they would like to do this when they get a new place. He was last seen here on 9/1/2020 in virtual video visit. His wife noted that the Aricept is helping. She believes the increased dose has been helpful. He is remembering things like cleaning up better. There was some problems with focus or inattention noted. There was difficulty getting to physical therapy due to car problems, etc.  Labs were still pending at that time. Sleep evaluation was pending. He presents today in follow-up via virtual video visit. His wife reports that they moved and it has been a positive environment which has helped his condition. It is a better neighborhood, their own place.   She notes he is getting more of an initiative to do some light housework. He continues on Aricept which seems to be helping. He remembers to take his medications. He has been doing some cooking. He denies safety concerns like leaving the stove on. They deny mobility concerns. He walks to the store and back. He reports he still smokes black and milds, down to about every few days. He is not ready to quit smoking. They did not get a chance to get the labs done. He denies signs of sleep apnea such as snoring, excessive daytime fatigue, or witnessed apneic spells. He continues to see psychiatry and is on Depakote for mood. They went for therapy that was recommended after the previous neuropsych evaluation, and the therapy was in [place], and did not help, wasn't what they had in mind. Prior to Admission medications    Medication Sig Start Date End Date Taking? Authorizing Provider   donepeziL (ARICEPT) 10 mg tablet Take 1 Tab by mouth nightly. 9/1/20  Yes Albina Brown NP   divalproex ER (DEPAKOTE ER) 500 mg ER tablet  3/28/20  Yes Provider, Historical   buPROPion XL (WELLBUTRIN XL) 300 mg XL tablet  6/12/19  Yes Provider, Historical   amLODIPine (NORVASC) 5 mg tablet  5/29/19  Yes Provider, Historical   atorvastatin (LIPITOR) 10 mg tablet  5/29/19  Yes Provider, Historical   ergocalciferol (ERGOCALCIFEROL) 50,000 unit capsule  5/30/19  Yes Provider, Historical     There is no problem list on file for this patient. Current Outpatient Medications   Medication Sig Dispense Refill    donepeziL (ARICEPT) 10 mg tablet Take 1 Tab by mouth nightly. 30 Tab 5    divalproex ER (DEPAKOTE ER) 500 mg ER tablet       buPROPion XL (WELLBUTRIN XL) 300 mg XL tablet       amLODIPine (NORVASC) 5 mg tablet       atorvastatin (LIPITOR) 10 mg tablet       ergocalciferol (ERGOCALCIFEROL) 50,000 unit capsule        Allergies   Allergen Reactions    Penicillin Other (comments)     HA    Pcn [Penicillins] Other (comments)     headache     No past medical history on file.   No past surgical history on file. History reviewed. No pertinent family history. Social History     Tobacco Use    Smoking status: Current Some Day Smoker     Types: Cigars    Smokeless tobacco: Never Used    Tobacco comment: \"black and mild\"   Substance Use Topics    Alcohol use: Yes     Alcohol/week: 2.0 standard drinks     Types: 2 Cans of beer per week       ROS  GENERAL: Denies fever or fatigue  CARDIAC: No CP or SOB  PULMONARY: No cough or SOB  MUSCULOSKELETAL: No new joint pain  NEURO: SEE HPI    Objective:     Patient-Reported Vitals 1/29/2021   Patient-Reported Weight 250.0   Patient-Reported Height -        Constitutional: [x] Appears well-developed and well-nourished [x] No apparent distress      [] Abnormal -     Mental status: [x] Alert and awake  [x] Oriented to person/place/time [x] Able to follow commands. Oriented to self, place, year as 2021, month, day of the week. Recalls 3/3 words at 1 min. [] Abnormal -     Eyes:   EOM    [x]  Normal    [] Abnormal -   Sclera  [x]  Normal    [] Abnormal -          Discharge [x]  None visible   [] Abnormal -     HENT: [x] Normocephalic, atraumatic  [] Abnormal -   [] Mouth/Throat: Mucous membranes are moist    External Ears [] Normal  [] Abnormal -    Neck: [x] No visualized mass [] Abnormal -     Pulmonary/Chest: [x] Respiratory effort normal   [x] No visualized signs of difficulty breathing or respiratory distress        [] Abnormal -      Musculoskeletal:   [x] Normal gait with no signs of ataxia         [x] Normal range of motion of neck        [] Abnormal -     Neurological:        [x] No Facial Asymmetry (Cranial nerve 7 motor function) (limited exam due to video visit). Tongue is midline. Able to move all extremities antigravity. Finger tapping symmetrical. No apparent dysmetria of the BUE.  Gait appears normal.         [x] No gaze palsy        [] Abnormal -         Skin:        [x] No significant exanthematous lesions or discoloration noted on facial skin         [] Abnormal -            Psychiatric:       [x] Normal Affect [] Abnormal -        [x] No Hallucinations    Other pertinent observable physical exam findings:-        We discussed the expected course, resolution and complications of the diagnosis(es) in detail. Medication risks, benefits, costs, interactions, and alternatives were discussed as indicated. I advised him to contact the office if his condition worsens, changes or fails to improve as anticipated. He expressed understanding with the diagnosis(es) and plan. Marcie Carrillo, who was evaluated through a patient-initiated, synchronous (real-time) audio-video encounter, and/or his healthcare decision maker, is aware that it is a billable service, with coverage as determined by his insurance carrier. He provided verbal consent to proceed: Yes, and patient identification was verified. It was conducted pursuant to the emergency declaration under the [address] [place] [address], [address] [place] [address] authority and the [employer] General Act. A caregiver was present when appropriate. Ability to conduct physical exam was limited. I was at home. The patient was at home.       Flakito Brown NP

## 2021-01-29 NOTE — PROGRESS NOTES
Paula Ibarra is a 50 y.o. male who will be using a cell phone for today's VV. 1. Have you been to the ER, urgent care clinic since your last visit? Hospitalized since your last visit? No    2. Have you seen or consulted any other health care providers outside of the [facility] since your last visit? Include any pap smears or colon screening.  No     This will be the cell phone number [phone]

## 2021-07-14 ENCOUNTER — OFFICE VISIT (OUTPATIENT)
Dept: NEUROLOGY | Age: 46
End: 2021-07-14
Payer: COMMERCIAL

## 2021-07-14 VITALS
HEART RATE: 78 BPM | SYSTOLIC BLOOD PRESSURE: 134 MMHG | DIASTOLIC BLOOD PRESSURE: 80 MMHG | OXYGEN SATURATION: 98 % | BODY MASS INDEX: 29.04 KG/M2 | WEIGHT: 238.6 LBS | RESPIRATION RATE: 16 BRPM

## 2021-07-14 DIAGNOSIS — I67.9 CEREBROVASCULAR DISEASE: ICD-10-CM

## 2021-07-14 DIAGNOSIS — G31.84 MILD COGNITIVE IMPAIRMENT: Primary | ICD-10-CM

## 2021-07-14 PROCEDURE — 99215 OFFICE O/P EST HI 40 MIN: CPT | Performed by: NURSE PRACTITIONER

## 2021-07-14 RX ORDER — ASPIRIN 81 MG/1
81 TABLET ORAL DAILY
Qty: 30 TABLET | Refills: 6 | Status: SHIPPED | OUTPATIENT
Start: 2021-07-14

## 2021-07-14 RX ORDER — LACTULOSE 10 G/15ML
SOLUTION ORAL; RECTAL
COMMUNITY
Start: 2021-06-21 | End: 2022-03-29
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
## 2021-07-14 NOTE — PROGRESS NOTES
Yesica Arias is a 48 y.o. male who is in the office as a follow up. 1. Have you been to the ER, urgent care clinic since your last visit? Hospitalized since your last visit?no    2. Have you seen or consulted any other health care providers outside of the [address] [place] [address] since your last visit? Include any pap smears or colon screening.  No

## 2021-09-27 ENCOUNTER — OFFICE VISIT (OUTPATIENT)
Dept: NEUROLOGY | Age: 46
End: 2021-09-27
Payer: COMMERCIAL

## 2021-09-27 DIAGNOSIS — F41.8 ANXIETY ABOUT HEALTH: ICD-10-CM

## 2021-09-27 DIAGNOSIS — R45.4 IRRITABILITY: ICD-10-CM

## 2021-09-27 DIAGNOSIS — R41.89 COGNITIVE DECLINE: Primary | ICD-10-CM

## 2021-09-27 PROCEDURE — 90791 PSYCH DIAGNOSTIC EVALUATION: CPT | Performed by: PSYCHOLOGIST

## 2021-09-27 NOTE — PROGRESS NOTES
[facility] [address] [facility]  Neuroscience   [address] [facility], [address]  Office:  [phone]  Fax: [phone]                  Initial Office Exam  Patient Name: Loraine Dash  Age: 48 y.o. Gender: male   Handedness: right handed   Presenting Concern: cognitive decline  Primary Care Physician: Jaguar Verdin MD  Referring Provider: Brisa Jacobsen NP      REASON FOR REFERRAL:  This comprehensive and medically necessary neuropsychological assessment was requested to assist a differential diagnosis of memory complaints. The use and purpose of this examination, as well as the extent and limitations of confidentiality, were explained prior to obtaining permission to participate. Instructions were provided regarding the necessity to put forth optimal effort and answer questions truthfully in order to obtain reliable and accurate test results. REVIEW OF RECORDS:  Mr. Isi Guadalupe was referred by neurology for serial testing. His wife is raised concerns about judgment and a need for increased supervision. Aricept has been started. PT was ordered due to gait and balance and mobility issues. Mr. Isi Guadalupe is followed by psychiatry. A sleep evaluation is pending. Mr. Isi Guadalupe was seen by me in March 2020. At that time, he was being followed by neurology for encephalitis which was experienced in October 2018 and involved a 1 month hospitalization. Subsequently, Mr. Isi Guadalupe began experiencing cognitive decline, significant psychomotor slowing, and significant functional impairment (poor hygiene, inability to ride on the bus alone). Diagnostic impressions included mild cognitive impairment and adjustment disorder with anxious features. Unfortunately, diagnostic impressions were limited due to performance on the TOMM. Psychotherapy, compensatory strategies, sleep hygiene, and social work interventions were suggested.     An MRI in 6/19 showed:  1.  No acute infarct, mass effect, or herniation. 2.  Chronic left basal ganglia infarct with hemosiderin staining from associated  hemorrhage. Similar finding described on prior outside report. 3. Mild to moderate amount of white matter disease, grossly stable in comparison  to 12/14/2015 CT head, and similar to description on prior outside report. This  white matter disease is nonspecific and may represent chronic small vessel  ischemic changes, especially if there is history of severe hypertension and/or  diabetes, although distribution is mildly atypical. Differential diagnosis would  also include other entities such as demyelinating disease, vasculitis, and  various other inflammatory and infectious etiologies.     Current Outpatient Medications   Medication Sig    lactulose (CHRONULAC) 10 gram/15 mL solution TAKE 15 MILLILITER ONCE A DAY, TAKE 15 ML ONCE A DAY.  aspirin delayed-release 81 mg tablet Take 1 Tablet by mouth daily.  donepeziL (ARICEPT) 10 mg tablet Take 1 Tab by mouth nightly.  amLODIPine (NORVASC) 5 mg tablet     atorvastatin (LIPITOR) 10 mg tablet     ergocalciferol (ERGOCALCIFEROL) 50,000 unit capsule      No current facility-administered medications for this visit. CLINICAL INTERVIEW:  Mr. Matt Horner was accompanied by his wife for his initial visit. Consistent with records, his wife reported a decline in his overall cognitive abilities. She stated that he is having particular difficulty with multitasking and seems to lack initiative for self-care and eating. She also indicated that his processing speed remains slow and he no longer is able to have complex conversations. She also noted that he has very little interest in foreign languages, a previous past time that he enjoyed. Unusual spending habits were also reported. In particular, Mr. Matt Horner gets SSI and reportedly spends all of it on takeout food. His wife stated that he will buy two breakfast or two lunches.   She also stated that he will buy toothbrushes but not use them. During her explanation of his behavior, Mr. Maria Johnson became notably upset and did not concur with her observations. Sleep disturbance is significant for nocturia. Snoring was denied. Pain complaints were denied. Mr. Maria Johnson denied alcohol use but his wife stated that in the last 2 months, he is come home intoxicated on 2 occasions. Tobacco use includes 1-2 black and mild cigarettes per day. Illicit substance use was denied. 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 Family history of neurologic illness is significant for MS and a nephew and strokes in Mr. Jimenez's mother. With regard to emotional functioning, Mr. Maria Johnson was previously diagnosed with bipolar I due to a history of irritability, impulsivity, and anger outburst.  When asked about this history, Mr. Maria Johnson stated that he is no longer seeing psychiatry. He was previously followed at [address] [place] [address]. He stated that he is no longer on any psychotropic medications. His wife stated that her  has become increasingly irritable and argumentative. Socially, Mr. Maria Johnson has been  for 6 years and has no children. Academically, he completed 12 years of education and was previously employed for [employer]. He is currently receiving disability and the [employer] is his payee. His wife is managing household finances. Mr. Maria Johnson is independent for medication management but often forgets to take his medications. He is not driving. His wife is hoping to obtain in-home nursing help.       MENTAL STATUS:    Sensorium  Awake, Aware, Alert   Orientation person, place, situation, day of week, month of year and year   Relations guarded   Eye Contact appropriate   Appearance:  age appropriate   Motor Behavior:  restless   Speech:  loud   Vocabulary average   Thought Process: within normal limits   Thought Content free of delusions and free of hallucinations   Suicidal ideations none   Homicidal ideations none   Mood:  irritable   Affect:  mood-congruent   Memory recent  impaired   Memory remote:  adequate   Concentration:  impaired   Abstraction:  concrete   Insight:  limited   Reliability poor   Judgment:  limited         DIAGNOSTIC IMPRESSIONS:  1. Cognitive Decline: R/O Major Neurocognitive Disorder  2. Anxiety about health  3. Irritability     PLAN:  1. Complete a comprehensive neuropsychological assessment to provide a differential diagnosis of presenting concerns as well as to assist with disposition and treatment planning as appropriate. 2. Consider compensatory and remedial cognitive training. 3. Consider nonpharmacological interventions for mood disorder. 4. Consider an adaptive driving evaluation. 5. Consider referral for elder health nurse to provide an in-home functional assessment. 6. Consider placement issues to provide greater structure and supervision to ensure safety, health and well-being. [id number] x 1 Review of records. Face to face interview w/ patient. Determine test protocol: 60 minutes. Total 1 unit      Renetta Marie, PHD  Licensed Clinical Psychologist    This note was created using voice recognition software. Despite editing, there may be syntax errors.

## 2021-10-28 ENCOUNTER — OFFICE VISIT (OUTPATIENT)
Dept: NEUROLOGY | Age: 46
End: 2021-10-28
Payer: COMMERCIAL

## 2021-10-28 DIAGNOSIS — F33.1 MODERATE EPISODE OF RECURRENT MAJOR DEPRESSIVE DISORDER (HCC): ICD-10-CM

## 2021-10-28 DIAGNOSIS — G31.84 MILD NEUROCOGNITIVE DISORDER: Primary | ICD-10-CM

## 2021-10-28 PROCEDURE — 96136 PSYCL/NRPSYC TST PHY/QHP 1ST: CPT | Performed by: PSYCHOLOGIST

## 2021-10-28 PROCEDURE — 96133 NRPSYC TST EVAL PHYS/QHP EA: CPT | Performed by: PSYCHOLOGIST

## 2021-10-28 PROCEDURE — 96139 PSYCL/NRPSYC TST TECH EA: CPT | Performed by: PSYCHOLOGIST

## 2021-10-28 PROCEDURE — 96132 NRPSYC TST EVAL PHYS/QHP 1ST: CPT | Performed by: PSYCHOLOGIST

## 2021-10-28 PROCEDURE — 96137 PSYCL/NRPSYC TST PHY/QHP EA: CPT | Performed by: PSYCHOLOGIST

## 2021-10-28 PROCEDURE — 96138 PSYCL/NRPSYC TECH 1ST: CPT | Performed by: PSYCHOLOGIST

## 2021-10-28 NOTE — PROGRESS NOTES
[facility]  Neuroscience   [facility]. [facility], [address]  Office:  [phone]  Fax: [phone]                  Neuropsychological Evaluation Report    Patient Name: Ray Guzman  Age: 48 y.o. Gender: male   Handedness: right handed   Presenting Concern: cognitive decline  Primary Care Physician: Carter Lester MD  Referring Provider: Manny Dillard NP    PATIENT HISTORY (OBTAINED DURING INITIAL CLINICAL EVALUATION):    REASON FOR REFERRAL:  This comprehensive and medically necessary neuropsychological assessment was requested to assist a differential diagnosis of memory complaints. The use and purpose of this examination, as well as the extent and limitations of confidentiality, were explained prior to obtaining permission to participate. Instructions were provided regarding the necessity to put forth optimal effort and answer questions truthfully in order to obtain reliable and accurate test results. REVIEW OF RECORDS:  Mr. Kady Moralez was referred by neurology for serial testing. His wife has raised concerns about impaired judgment and a need for increased supervision. Aricept has been started. PT was ordered due to gait and balance and mobility issues. Mr. Kady Moralez is followed by psychiatry. A sleep evaluation is pending. Mr. Kady Moralez was seen by me in March 2020. 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 Subsequently, Mr. Kady Moralez began experiencing cognitive decline, significant psychomotor slowing, and significant functional impairment (poor hygiene, inability to ride on the bus alone). Diagnostic impressions included mild cognitive impairment and adjustment disorder with anxious features. Unfortunately, diagnostic impressions were limited due to performance on the TOMM.   Psychotherapy, compensatory strategies, sleep hygiene, and social work interventions were suggested. An MRI in 6/19 showed:  1.  No acute infarct, mass effect, or herniation. 2.  Chronic left basal ganglia infarct with hemosiderin staining from associated  hemorrhage. Similar finding described on prior outside report. 3. Mild to moderate amount of white matter disease, grossly stable in comparison  to 12/14/2015 CT head, and similar to description on prior outside report. This  white matter disease is nonspecific and may represent chronic small vessel  ischemic changes, especially if there is history of severe hypertension and/or  diabetes, although distribution is mildly atypical. Differential diagnosis would  also include other entities such as demyelinating disease, vasculitis, and  various other inflammatory and infectious etiologies.     Current Outpatient Medications   Medication Sig    lactulose (CHRONULAC) 10 gram/15 mL solution TAKE 15 MILLILITER ONCE A DAY, TAKE 15 ML ONCE A DAY.  aspirin delayed-release 81 mg tablet Take 1 Tablet by mouth daily.  donepeziL (ARICEPT) 10 mg tablet Take 1 Tab by mouth nightly.  amLODIPine (NORVASC) 5 mg tablet     atorvastatin (LIPITOR) 10 mg tablet     ergocalciferol (ERGOCALCIFEROL) 50,000 unit capsule      No current facility-administered medications for this visit. CLINICAL INTERVIEW:  Mr. Jonathon Aly was accompanied by his wife for his initial visit. Consistent with records, his wife reported a decline in his overall cognitive abilities. She stated that he is having particular difficulty with multitasking and seems to lack initiative for self-care and eating. She also indicated that his processing speed remains slow and he no longer is able to have complex conversations. She also noted that he has very little interest in foreign languages, a previous past time that he enjoyed. Unusual spending habits were also reported. In particular, Mr. Jonathon Aly gets SSI and reportedly spends all of it on takeout food.   His wife stated that he will buy two breakfast or two lunches. She also stated that he will buy toothbrushes but not use them. During her explanation of his behavior, Mr. Vita Lacy became notably upset and did not concur with her observations. Sleep disturbance is significant for nocturia. Snoring was denied. Pain complaints were denied. Mr. Vita Lacy denied alcohol use but his wife stated that in the last 2 months, he is come home intoxicated on 2 occasions. Tobacco use includes 1-2 black and mild cigarettes per day. Illicit substance use was denied. 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 Family history of neurologic illness is significant for MS and a nephew and strokes in Mr. Jimenez's mother. With regard to emotional functioning, Mr. Vita Lacy was previously diagnosed with Bipolar I due to a history of irritability, impulsivity, and anger outburst.  When asked about this history, Mr. Vita Lacy stated that he is no longer seeing psychiatry. He was previously followed at [address] [place] [address]. He stated that he is no longer on any psychotropic medications. His wife stated that her  has become increasingly irritable and argumentative. Socially, Mr. Vita Lacy has been  for 6 years and has no children. Academically, he completed 12 years of education and was previously employed for [employer]. He is currently receiving disability and the [employer] is his payee. His wife is managing household finances. Mr. Vita Lacy is independent for medication management but often forgets to take his medications. He is not driving. His wife is hoping to obtain in-home nursing help.     MENTAL STATUS:    Sensorium  Awake, Aware, Alert   Orientation person, place, situation, day of week, month of year and year   Relations guarded   Eye Contact appropriate   Appearance:  age appropriate   Motor Behavior:  restless   Speech:  loud   Vocabulary average   Thought Process: within normal limits Thought Content free of delusions and free of hallucinations   Suicidal ideations none   Homicidal ideations none   Mood:  irritable   Affect:  mood-congruent   Memory recent  impaired   Memory remote:  adequate   Concentration:  impaired   Abstraction:  concrete   Insight:  limited   Reliability poor   Judgment:  limited     METHODS OF ASSESSMENT (Current Evaluation):  Clinician Administered: Adaptive Behvatior Assessment System (ABAS)  Nugent Anxiety Scale (SENDY)  Nugent Depression Scale-II (BDI-II)    Technician Administered Enrique Rush CSP): Animals  Controlled Oral Word Association Test  Integrated Visual and Auditory Continuous Performance Test  Frontal Assessment Battery  Neuropsychological Assessment Battery-Memory Module and other select subtests  Reliable Digit Span  Test of Memory Malingering  Trailmaking Test  Wechsler Adult Intelligence Scale-IV    TEST OBSERVATIONS:  Mr. Tatum Blanco arrived promptly for the testing session. Dress and grooming were appropriate; physical presentation was unchanged from that observed during the clinical interview. Speech was mumbled; response style was delayed. Comprehension difficulties were noted, necessitating the repetition of instructions. Motor function was slow. Thought process was tangential.  Mr. Tatum Blanco was described as very talkative. He tended to tell stories and wanted to talk about each word on the list recognition test. Thought content showed no apparent delusional ideation. Auditory and visual hallucinations were denied and there was no obvious response to internal stimuli. Affect was congruent with the euthymic mood conveyed. Mr. Tatum Blanco was adequately cooperative and appeared to put forth good effort throughout this examination. Rapport with the examiner was adequately established and maintained. Performance motivation was objectively measured with two instruments (TOMM, Reliable Digit Span); Mr. Tatum Blanco produced normal scores on these measures. Accordingly, test findings below do not appear to be the product of disingenuous effort. Given the above observations, plus comments contained in the Mental Status section, the results of this examination are regarded as reasonably reliable and valid. TEST RESULTS:  Quantitative test results are derived from comparisons to age and education corrected cohort normative data, where applicable. Percentiles are included in these instances. Qualitative test results are determined using clinical observations. General Orientation and Awareness:       Orientation to person yes   Time no  Place yes  Circumstance yes                     Sensory-Perceptual and Motor Functioning:    Visual and auditory acuity:  WNL       Gait and balance:   Ambulated Independently                 Attention/Concentration:       Simple visuomotor tracking (1 percentile)                    Severely Impaired   Digits forward (37 percentile)                      Average  Digits backward (50 percentile)            Average     On a continuous performance test, response style was invalid for visual stimuli. With respect to auditory attention, deficiencies were noted with response control, auditory attention, and sustained attention.     Visuospatial and Constructional Praxis:     Visual discrimination (54 percentile)                              Average     Language:            Phonemic verbal fluency (14 percentile)                                Low Average  Categorical fluency (12 percentile)                    Low Average   Auditory comprehension (76 percentile)                               Above Average   Naming (66 percentile)                       Average    Memory and Learning:       Word list immediate recall (38 percentile)                Average  Word list short delayed recall (5 percentile)                Mildly Impaired  Word list long delayed recall (3 percentile)                           Moderately Impaired  Forced Choice Recognition (50 percentile)     Average  Shape learning immediate recognition (24 percentile)    Low Average    Shape learning delayed recognition (27 percentile)               Average  Forced Choice Recognition (<1 percentile)     Severely Impaired  Story learning immediate recall (16 percentile)     Low Average  Story learning delayed recall (18 percentile)                           Low Average    Cognitive Tests of Executive Functioning:     Ability to think flexibly, Trailmaking B (10 percentile)               Low Average     On the frontal assessment battery, Mr. Becerrils performance was not suggestive of a frontal, dysexecutive type dementia.     Intellectual and Basic Cognitive Functioning (WAIS-IV)  Verbal Comprehension Index: 102 Percentile: 55   Average   Similarities: 9    Percentile: 37      Vocabulary: 10    Percentile: 50           Information: 12   Percentile: 75     Perceptual Reasoning Index: 84  Percentile: 14   Low Average   Block Design: 7   Percentile: 16      Matrix Reasonin   Percentile: 25           Visual Puzzles: 7   Percentile: 16     Working Memory Index: 86  Percentile: 18   Low Average   Digit Span: 8    Percentile: 25      Arithmetic: 7    Percentile: 16     Processing Speed: 71   Percentile: 3   Borderline   Symbol Search: 6   Percentile: 9      Coding: 3    Percentile: 1     Full Scale IQ: 84    Percentile: 14   Low Average    Adaptive Behavior Measure for Independent Living [facility] Functional Living Scale):  Time                 10-16 percentile  Low Average  Money and calculation   26-50 percentile  Average  Communication   26-50 percentile  Average  Memory     26-50 percentile  Average  Total                 21 percentile   Low Average    Adaptive Behavior (Adaptive Behavior Assessment System)  General Adaptive Composite:  52   Percentile: <1 Severely Impaired   Conceptual:  54    Percentile: <1 Severely Impaired   Social:  56    Percentile: <1 Severely Impaired   Practical:  54    Percentile: <1 Severely Impaired    Emotional Functioning:  Screening of Emotional/Psychiatric Status:  Level of self-reported anxiety    (4/63)  Minimal  Level of self-reported depression   (32/63)  Severe    IMPRESSIONS/RECOMMENDATIONS:  Test results revealed deficiencies in the following areas: visual motor tracking, visual and auditory attention, list recall, shape recognition, and processing speed. While this would most often be conceptualized as chronic ADHD, I remain concerned about an evolving cognitive disorder due to findings on neuroimaging, neurologic history, behavioral observations, and the behavioral syndrome described by Mr. Cotto's wife (increased irritability, argumentative tendencies, reduced interest in previous activities, unusual spending habits, problematic alcohol use). With regard to the latter, it is entirely possible that Mr. Cotto's wife's description of his behavioral change could be secondary to depression and indeed, he did report at least a moderate degree of depression. However, I believe this is either ADHD complicated by underlying psychiatric difficulties or an evolving cognitive disorder that involves a behavioral syndrome, also with underlying psychiatric difficulties. Therefore, I would suggest a stepwise approach to treatment. In particular, I would recommend considering a trial of medication for Mr. Cotto's attention deficit. I would also recommend serial testing in 12 months to compare with baseline and to inform treatment accordingly. In the interim, Mr. Marcy Kramer is strongly encouraged to follow back up with psychiatry and psychology. He is also encouraged to receive assistance for medication management. A pill sorter might be particularly helpful. Otherwise, Mr. Marcy Kramer is encouraged to consider the general recommendations below. DIAGNOSTIC IMPRESSIONS:  1. Mild Neurocognitive Disorder  2.  Major Depressive Disorder, moderate    GENERAL RECOMMENDATIONS:   Exercise: Exercise can improve your thinking and ability to focus. Activities such as gardening, caring for pets, or walking, can help improve your attention and concentration levels. Meditation: Meditation can help improve brain function by increasing your focus and awareness. Day-to-day coping   Use a detailed daily planner, notebooks, reminder notes, or your smart phone. [place] [employer] Keeping everything in one place makes it easier to find the reminders you may need. You might want to keep track of appointments and schedules, to do lists, important dates, websites, phone numbers and addresses, meeting notes, and even movies youd like to see or books youd like to read.  Do the most demanding tasks at the time of they day when you feel your energy levels are the highest.   Exercise your brain. Take a class, do word puzzles, or learn a new language.  Get enough rest and sleep.  Keep moving. Regular physical activity is not only good for your body, but also improves your mood, makes you feel more alert, and decreases tiredness (fatigue).  Eat veggies. Studies have shown that eating more vegetables is linked to keeping brain power as people age.  Set up and follow routines. Try to keep the same daily schedule.  Pick a certain place for commonly lost objects (like keys) and put them there each time.  Try not to multi-task. Focus on one thing at a time.  Avoid alcohol and other agents that might change your mental state and sleeping patterns   Ask for help when you need it. Friends and loved ones can help with daily tasks to cut down on distractions and help you save mental energy.  Track your memory problems. Keep a diary of when you notice problems and whats going on at the time. Medicines taken, time of day, and the situation youre in might help you figure out what affects your memory. Keeping track of when the problems are most noticeable can also help you prepare.  Juliet Kussmaul know to avoid planning important conversations or appointments during those times. This record will also be useful when you talk with your doctor about these problems. Thank you for allowing me the opportunity to assist you in Mr. Cotto's care. Please do not hesitate to contact me should you have additional questions that I may not have addressed. 62011 x 1  96137 x 1  96138 x 1  96139 x 4  96132 x 1  96133 x 1    [facility] Roberto Alicia, Ph.D.   Licensed Clinical Psychologist    I have reviewed the documentation provided by Dr. Gulshan Cordon, PhD, Milan Gunter. Dr. Roberto Alicia is in her second year of Fellowship in Clinical Neuropsychology. Dr. Roberto Alicia is licensed and credentialed to practice in the [facility], is providing the current services via her NPI and licensure, and had been providing similar services prior to her employment with [address] [place] [address]. No additional insurance billing is done by me on her cases, my NPI is not being used, etc.   I have not had any face to face engagement with her patients, and am providing supervision and consultation services with her as she works towards advancing her career and subspecialities. I have reviewed the history, the neurocognitive and psychological test results, the medical records available, and the impressions and recommendations generated by Dr. Roberto Alicia. We have engaged in peer to peer supervision as needed. I have reviewed history noted in the records, the tests administered, the test scores, and the overall case history and profile and report generated by Dr. Roberto Alicia. Dr. J Carlos Schofield clinical case formulation, diagnostic impressions, and the proposed management plans/treatment recommendations are her own and based on her clinical training, level of expertise, and judgment.   Any additional comments, concerns, or recommendations that I am making are offered here:  Unusual profile - could be underlying ADHD but I think this may be additional factors here more aligned with MCI which needs to be monitored and addressed as noted. VICKIE Bill  Neuropsychology      Time Documentation:       07715 x 1   70786 x 1 Neuropsych testing/data gathering by Neuropsychologist: (1 hour). 07304 x1 (first 30 minutes), 96137 x 1 (additional 30 minutes)     96138 x 1  96139 x 4 Test Administration/Data Gathering By Technician: (2.5 hours). 42974 x 1 (first 30 minutes), 96139 x 4 (each additional 30 minutes)     96132 x 1  96133 x 1 Testing Evaluation Services by Neuropsychologist (1 hour, 50 minutes), 42953 x1 (first hour), 53598 x1 (additional 50 minutes)     The above includes: Record review. Review of history provided by patient. Review of collaborative information. Testing by Clinician. Review of raw data. Scoring. Report writing of individual tests administered by Clinician. Integration of individual tests administered by psychometrist with NSE/testing by clinician, review of records/history/collaborative information, case Conceptualization, treatment planning, clinical decision making, report writing, coordination Of Care. Psychometry test codes as time spent by psychometrist administering and scoring neurocognitive/psychological tests under supervision of neuropsychologist.  Integral services including scoring of raw data, data interpretation, case conceptualization, report writing etcetera were initiated after the patient finished testing/raw data collected and was completed on the date the report was signed. This note will not be viewable in [address] This note was created using voice recognition software. Despite editing, there may be syntax errors.

## 2022-03-23 ENCOUNTER — OFFICE VISIT (OUTPATIENT)
Dept: NEUROLOGY | Age: 47
End: 2022-03-23
Payer: COMMERCIAL

## 2022-03-23 DIAGNOSIS — G31.84 MILD NEUROCOGNITIVE DISORDER: Primary | ICD-10-CM

## 2022-03-23 DIAGNOSIS — F33.1 MODERATE EPISODE OF RECURRENT MAJOR DEPRESSIVE DISORDER (HCC): ICD-10-CM

## 2022-03-23 PROCEDURE — 90832 PSYTX W PT 30 MINUTES: CPT | Performed by: PSYCHOLOGIST

## 2022-03-23 NOTE — PROGRESS NOTES
Interactive Psychotherapy/office feedback        Interactive office feedback session with Mr. Kumar Puri and his wife. I reviewed the results of the recent Neuropsychological Evaluation  including the observed areas of neurocognitive strengths and weaknesses. Education was provided regarding my diagnostic impressions, and treatment plan/options were discussed. I also answered numerous questions related to the clinical findings, including the various methods to improve cognition and mood. CBT, psychoeducation, and supportive psychotherapy techniques were utilized. Ms. Kumar Puri is willing to consider scheduling with his wife's psychiatrist for his mood symptoms. He intends to discuss medication for his attention deficit with his neurology provided. Prior to seeing the patient I reviewed the records, including the previously completed report, the records in [facility], and any updated visits from other providers since I saw the patient last.       Diagnoses:      1. Mild Neurocognitive Disorder  2. Major Depressive Disorder, moderate    The patient will follow up with the referring provider, and reported being very pleased with the services provided. [facility] [address] psychotherapy 30 Minutes      This note was created using voice recognition software. Despite editing, there may be syntax errors.

## 2022-03-29 ENCOUNTER — OFFICE VISIT (OUTPATIENT)
Dept: NEUROLOGY | Age: 47
End: 2022-03-29
Payer: COMMERCIAL

## 2022-03-29 VITALS
RESPIRATION RATE: 22 BRPM | WEIGHT: 256.4 LBS | HEIGHT: 76 IN | SYSTOLIC BLOOD PRESSURE: 126 MMHG | OXYGEN SATURATION: 96 % | HEART RATE: 81 BPM | DIASTOLIC BLOOD PRESSURE: 78 MMHG | BODY MASS INDEX: 31.22 KG/M2

## 2022-03-29 DIAGNOSIS — G31.84 MILD NEUROCOGNITIVE DISORDER: Primary | ICD-10-CM

## 2022-03-29 DIAGNOSIS — F33.1 MODERATE EPISODE OF RECURRENT MAJOR DEPRESSIVE DISORDER (HCC): ICD-10-CM

## 2022-03-29 PROCEDURE — 99214 OFFICE O/P EST MOD 30 MIN: CPT | Performed by: NURSE PRACTITIONER

## 2022-03-29 RX ORDER — BUPROPION HYDROCHLORIDE 150 MG/1
150 TABLET ORAL
Qty: 60 TABLET | Refills: 3 | Status: SHIPPED | OUTPATIENT
Start: 2022-03-29 | End: 2022-04-27 | Stop reason: SDUPTHER

## 2022-03-29 NOTE — PROGRESS NOTES
[facility]  [address], [address], [place], [address]  [address] Miky Schneider, [address]  Office:  [phone]  Fax: [phone]  Chief Complaint   Patient presents with    Follow-up     neuropsy complete       HPI: Linda Villalta presents in follow-up for cognitive changes after an episode of encephalopathy in October 2018. [facility] was hospitalized for 1 month in [facility] at that time. Mariaa Johnsonsamarar that episode there was a dramatic decrease in his functional capacity.  It was noted that he appeared to be psychomotor slow.  His last MRI of the brain was from June 2019 which reported chronic left basal ganglia infarct with hemosiderin staining from associated hemorrhage, and mild to moderate amount of white matter disease.   EEG in July 2019 was normal.  He underwent neuropsych testing in March 2020. Elif Weeks was noted that the cognitive decline was nonspecific with etiology pointing to his episode of encephalitis. [facility] started on a low-dose of Aricept thereafter.  At the visit on 7/9/2020, it was noted that there will have been some improvements since starting Aricept.  The Aricept was increased to 10 mg daily.  Labs were to be obtained including CBC, CMP, ammonia, vitamin B12 level.  He was referred for sleep evaluation due to the sleep concerns as well as cognitive complaints. [facility] was referred to physical therapy due to his gait imbalance and mobility issues and an assistive device can be considered. [facility] continues to see psychiatry.  Social work was going to be considered due to recommendation after neuropsych evaluation, and they noted they would like to do this when they get a new place.  At the virtual visit in September 2020 his wife noted that the Aricept is helping. Swati Haskins believes the increased dose has been helpful. [facility] is remembering things like cleaning up better. Sobeida Jarrell was some problems with focus or inattention noted. Sobeida Jarrell was difficulty getting to physical therapy due to car problems, etc.  Labs were still pending at that time. [facility] evaluation was pending. He was seen here on 1/29/2021 in virtual video visit. His cognition seemed to be improving over time and they noted that their new living situation was helpful. He continued on Aricept. He was on Depakote for mood and follows with psychiatry. He went to therapy but they did not find it beneficial.  Previously ordered labs were not done yet so we planned to have these and then repeat the neuropsych evaluation at 1 year from prior, in March. Encouraged management of vascular risk factors. He was last seen here on 7/14/2021. He was in the ER in June 2021 for a headache. At the visit he reported no more headaches. He had a migraine at that time. His tooth was acting up at the time. CT noted frontal and parietal lobe white matter lucency, may be chronic ischemic microvascular disease, somewhat out of proportion to patient's age, so MRI of the head could be obtained not emergently. He denied worsening cognition and overall has been improving since the hospitalization over time but still some cognitive concerns. He continued on Aricept. The neuropsych evaluation with Dr. Yudy Chacon was to be repeated. He was to resume aspirin 81 mg. He wanted to hold off on MRI and MRA due to claustrophobia. It was noted that he had been off Aricept for the past few months and they denied worsening cognition and overall he has been improving over time since the hospitalization so we awaited testing prior to restarting Aricept. He presents today in follow-up. He is with his wife. He is sleepy, napping in here upon entering. We did not have the sleep specialist evaluation. She received a copy of the report from the neuropsych eval.  She called to schedule with a psychiatrist but he refused it. He saw a psychiatrist, he reports it was a long time ago, but she said they no longer take his insurance.   He denies depression or SI, just reports feeling sleepy. His sleep pattern varies. She reports he likes to stay up and watch tv. He was on Wellbutrin before (on list on EEG report from July 2019) and she reports he did well on it, but no longer was with a psychiatrist.  It was before the hospitalization in 2018. Initial /84. He did not take BP med yet today. She got a pillbox for his medications. He smokes 2 black and milds a day. Denies alcohol use. She would like him to let her know when he goes to the store. He does not drive. Reports PCP checked liver function and it was normal.  Reports he is no longer on lactulose. She is not sure if he had ammonia level. History reviewed. No pertinent past medical history. No past surgical history on file. Current Outpatient Medications   Medication Sig Dispense Refill    buPROPion XL (WELLBUTRIN XL) 150 mg tablet Take 1 Tablet by mouth every morning. Take 1 tab by mouth daily for 1 week, then 2 tabs daily thereafter. 60 Tablet 3    aspirin delayed-release 81 mg tablet Take 1 Tablet by mouth daily. 30 Tablet 6    amLODIPine (NORVASC) 5 mg tablet       atorvastatin (LIPITOR) 10 mg tablet       ergocalciferol (ERGOCALCIFEROL) 50,000 unit capsule           Allergies   Allergen Reactions    Penicillin Other (comments)     HA    Pcn [Penicillins] Other (comments)     headache       Social History     Tobacco Use    Smoking status: Current Some Day Smoker     Types: Cigars    Smokeless tobacco: Never Used    Tobacco comment: \"black and mild\"   Substance Use Topics    Alcohol use: Yes     Alcohol/week: 2.0 standard drinks     Types: 2 Cans of beer per week    Drug use: Not on file       History reviewed. No pertinent family history. Review of Systems:  GENERAL: Denies fever.  +fatigue  CARDIAC: No CP or SOB  PULMONARY: No cough or SOB  MUSCULOSKELETAL: No new joint pain  NEURO: SEE HPI    Physical Examination:  Visit Vitals  /78   Pulse 81   Resp 22   Ht 6' 4\" (1.93 m)   Wt 116.3 kg (256 lb 6.4 oz)   SpO2 96%   BMI 31.21 kg/m²       Sleeping upon my entering the room. Arousable to voice. In NAD. Heart is regular. Oriented x3. Speech is fluent. Speech clear. EOMs are full, PERRL, VFFTC, no nystagmus. No facial asymmetry. Tongue is midline. Strength and tone are normal. No drift of the bilateral upper extremities. Fine finger movements symmetrical. FNF intact bilaterally. DTRs +2, gait symmetric and steady. Impression/Plan: This is a 26-year-old male who presents in follow-up for mild cognitive impairment. He had impaired cognition after an episode of encephalopathy in October 2018 in which the etiology of the encephalopathic episode was not entirely clear. During that episode he had empiric IV steroids and empiric IVIG with reported improvement in his mental status. He has undergone neuropsych evaluation in March 2020 with a diagnosis of mild cognitive impairment and adjustment disorder with anxious features. It was noted at 1 point that there were some improvements since starting Aricept, however, has been off Aricept since prior to the last visit in July and he denied worsening cognition and overall he has been improving over time since the hospitalization. He was still with cognitive concerns noted by wife. The neuropsych evaluation was repeated. We discussed the impressions and recommendations today. Diagnostic impressions included mild neurocognitive disorder; and major depressive disorder, moderate. There was some concern for ADHD complicated by underlying psychiatric difficulties or an evolving cognitive disorder that includes a behavioral syndrome, also with underlying psychiatric difficulties. A stepwise approach was suggested including the trial of medication for attention deficit. Following back up with psychiatry and psychology was encouraged.   Will start bupropion which he was on in the past and she reports he tolerated, but was no longer seeing psychiatry. Instructed on administration and potential side effects of medication. He agrees to proceed with psychiatry referral.  Discussed the recommendation for sleep evaluation. She would like to hold on this for now but will consider this after being settled at work. Asked his wife to have the labs from PCP office sent here. Follow up in 6 weeks. Diagnoses and all orders for this visit:    1. Mild neurocognitive disorder    2. Moderate episode of recurrent major depressive disorder (HCC)  -     buPROPion XL (WELLBUTRIN XL) 150 mg tablet; Take 1 Tablet by mouth every morning. Take 1 tab by mouth daily for 1 week, then 2 tabs daily thereafter.  -     REFERRAL TO PSYCHIATRY      Total time 34 minutes with 20 minutes spent in counseling. Signed By: Tori Begum NP        PLEASE NOTE:   Portions of this document may have been produced using voice recognition software. Unrecognized errors in transcription may be present.

## 2022-03-29 NOTE — PATIENT INSTRUCTIONS
Learning About Sleeping Well  What does sleeping well mean? Sleeping well means getting enough sleep to feel good and stay healthy. How much sleep is enough varies among people. The number of hours you sleep and how you feel when you wake up are both important. If you do not feel refreshed, you probably need more sleep. Another sign of not getting enough sleep is feeling tired during the day. Experts recommend that adults get at least 7 or more hours of sleep per day. Children and older adults need more sleep. Why is getting enough sleep important? Getting enough quality sleep is a basic part of good health. When your sleep suffers, your physical health, mood, and your thoughts can suffer too. You may find yourself feeling more grumpy or stressed. Not getting enough sleep also can lead to serious problems, including injury, accidents, anxiety, and depression. What might cause poor sleeping? Many things can cause sleep problems, including:  · Changes to your sleep schedule. · Stress. Stress can be caused by fear about a single event, such as giving a speech. Or you may have ongoing stress, such as worry about work or school. · Depression, anxiety, and other mental or emotional conditions. · Changes in your sleep habits or surroundings. This includes changes that happen where you sleep, such as noise, light, or sleeping in a different bed. It also includes changes in your sleep pattern, such as having jet lag or working a late shift. · Health problems, such as pain, breathing problems, and restless legs syndrome. · Lack of regular exercise. · Using alcohol, nicotine, or caffeine before bed. How can you help yourself? Here are some tips that may help you sleep more soundly and wake up feeling more refreshed. Your sleeping area   · Use your bedroom only for sleeping and sex. A bit of light reading may help you fall asleep. But if it doesn't, do your reading elsewhere in the house.  Try not to use your TV, computer, smartphone, or tablet while you are in bed. · Be sure your bed is big enough to stretch out comfortably, especially if you have a sleep partner. · Keep your bedroom quiet, dark, and cool. Use curtains, blinds, or a sleep mask to block out light. To block out noise, use earplugs, soothing music, or a \"white noise\" machine. Your evening and bedtime routine   · Create a relaxing bedtime routine. You might want to take a warm shower or bath, or listen to soothing music. · Go to bed at the same time every night. And get up at the same time every morning, even if you feel tired. What to avoid   · Limit caffeine (coffee, tea, caffeinated sodas) during the day, and don't have any for at least 6 hours before bedtime. · Avoid drinking alcohol before bedtime. Alcohol can cause you to wake up more often during the night. · Try not to smoke or use tobacco, especially in the evening. Nicotine can keep you awake. · Limit naps during the day, especially close to bedtime. · Avoid lying in bed awake for too long. If you can't fall asleep or if you wake up in the middle of the night and can't get back to sleep within about 20 minutes, get out of bed and go to another room until you feel sleepy. · Avoid taking medicine right before bed that may keep you awake or make you feel hyper or energized. Your doctor can tell you if your medicine may do this and if you can take it earlier in the day. If you can't sleep   · Imagine yourself in a peaceful, pleasant scene. Focus on the details and feelings of being in a place that is relaxing. · Get up and do a quiet or boring activity until you feel sleepy. · Avoid drinking any liquids before going to bed to help prevent waking up often to use the bathroom. Where can you learn more? Go to http://www.akers.com/  Enter M806 in the search box to learn more about \"Learning About Sleeping Well. \"  Current as of: June 16, 2021               Content Version: 13.2  © 2006-2022 [employer]. Care instructions adapted under license by [employer] (which disclaims liability or warranty for this information). If you have questions about a medical condition or this instruction, always ask your healthcare professional. [address] any warranty or liability for your use of this information. Bupropion (By mouth)   Bupropion ([id number])  Treats depression and aids in quitting smoking. Also prevents depression caused by seasonal affective disorder (SAD). Brand Name(s): Aplenzin, Forfivo XL, Wellbutrin, Wellbutrin SR, Wellbutrin XL, Zyban   There may be other brand names for this medicine. When This Medicine Should Not Be Used: This medicine is not right for everyone. Do not use it if you had an allergic reaction to bupropion, or if you have seizures, anorexia, or bulimia. How to Use This Medicine:   Tablet, Long Acting Tablet  · Take your medicine as directed. Your dose may need to be changed several times to find what works best for you. · You may need to take Wellbutrin® for up to 4 weeks before you feel better. You may need to take Zyban® for 1 to 2 weeks before the date that you plan to stop smoking. · Swallow the extended-release tablet whole. Do not crush, break, or chew it. · It is best to take Aplenzin® in the morning. · Do not take Wellbutrin® or Zyban® close to bedtime if you have trouble sleeping. · Take it with food if it upsets your stomach or if you have nausea. · If you take the extended-release tablet, part of the tablet may pass into your stools. This is normal and is nothing to worry about. · This medicine should come with a Medication Guide. Ask your pharmacist for a copy if you do not have one. · Missed dose: Skip the missed dose and go back to your regular dosing schedule. Never take extra medicine to make up for a missed dose.   · Store the medicine in a closed container at room temperature, away from heat, moisture, and direct light. Drugs and Foods to Avoid:   Ask your doctor or pharmacist before using any other medicine, including over-the-counter medicines, vitamins, and herbal products. · Do not use this medicine and an MAO inhibitor (MAOI) within 14 days of each other. Do not use Zyban® to quit smoking if you already take Aplenzin® or Wellbutrin® for depression, because they are the same medicine. · Tell your doctor if you take barbiturates, benzodiazepines, antiseizure medicine, or sedatives, or if you recently stopped taking them. · Some medicines can affect how bupropion works. Tell your doctor if you use any of the following:   ¨ Amantadine, carbamazepine, cimetidine, clopidogrel, cyclophosphamide, digoxin, efavirenz, levodopa, lopinavir, nelfinavir, nicotine patch, orphenadrine, phenobarbital, phenytoin, ritonavir, tamoxifen, theophylline, thiotepa, ticlopidine  ¨ Beta blocker medicine (including metoprolol)  ¨ A blood thinner (including warfarin)  ¨ Insulin or diabetes medicine  ¨ Medicine to treat depression (including desipramine, fluoxetine, imipramine, nortriptyline, paroxetine, sertraline, venlafaxine)  ¨ Medicine to treat heart rhythm problems (including flecainide, propafenone)  ¨ Medicine to treat mental illness (including haloperidol, risperidone, thioridazine)  ¨ Steroid medicine (including dexamethasone, hydrocortisone, methylprednisolone, prednisolone, prednisone)  · Do not drink alcohol while you are using this medicine. · Tell your doctor if you use anything else that makes you sleepy. Some examples are allergy medicine, narcotic pain medicine, and alcohol. Warnings While Using This Medicine:   · Tell your doctor if you are pregnant or breastfeeding, or if you have kidney disease, liver disease, heart disease, diabetes, glaucoma, mental illness (including bipolar disorder), or high blood pressure.  Tell your doctor if you have a history of drug addiction or if you drink alcohol. · For some children, teenagers, and young adults, this medicine may increase mental or emotional problems. This may lead to thoughts of suicide and violence. Talk with your doctor right away if you have any thoughts or behavior changes that concern you. Tell your doctor if you or anyone in your family has a history of bipolar disorder or suicide attempts. · This medicine may cause the following problems:  ¨ Increased risk of seizures  ¨ Changes in mood or behavior  ¨ High blood pressure  ¨ Serious skin reactions  · This medicine may make you dizzy or drowsy. Do not drive or do anything that could be dangerous until you know how this medicine affects you. · Zyban® is only part of a complete program to help you quit smoking. You may still want to smoke at times. Have a plan to cope with these situations. · Do not stop using this medicine suddenly. Your doctor will need to slowly decrease your dose before you stop it completely. · Tell any doctor or dentist who treats you that you are using this medicine. This medicine may affect certain medical test results. · Your doctor will check your progress and the effects of this medicine at regular visits. Keep all appointments. · Keep all medicine out of the reach of children. Never share your medicine with anyone.   Possible Side Effects While Using This Medicine:   Call your doctor right away if you notice any of these side effects:  · Allergic reaction: Itching or hives, swelling in your face or hands, swelling or tingling in your mouth or throat, chest tightness, trouble breathing  · Blistering, peeling, red skin rash  · Chest pain, trouble breathing, fast, slow, or uneven heartbeat  · Eye pain, vision changes, seeing halos around lights  · Muscle or joint pain, fever with rash  · Seeing or hearing things that are not there, feeling like people are against you  · Seizures  · Sudden increase in energy, racing thoughts, trouble sleeping  · Thoughts of hurting yourself, worsening depression, severe agitation or confusion  If you notice these less serious side effects, talk with your doctor:   · Dry mouth  · Headache, dizziness  · Nausea, vomiting, constipation, diarrhea, gas, stomach pain  · Weight gain or loss  If you notice other side effects that you think are caused by this medicine, tell your doctor. Call your doctor for medical advice about side effects. You may report side effects to FDA at 4-943-EFP-0769  © 2017 [facility] Information is for End User's use only and may not be sold, redistributed or otherwise used for commercial purposes. The above information is an  only. It is not intended as medical advice for individual conditions or treatments. Talk to your doctor, nurse or pharmacist before following any medical regimen to see if it is safe and effective for you.

## 2022-08-01 DIAGNOSIS — F33.1 MODERATE EPISODE OF RECURRENT MAJOR DEPRESSIVE DISORDER (HCC): ICD-10-CM

## 2022-08-01 RX ORDER — BUPROPION HYDROCHLORIDE 150 MG/1
150 TABLET ORAL
Qty: 60 TABLET | Refills: 0 | Status: SHIPPED | OUTPATIENT
Start: 2022-08-01 | End: 2022-08-05 | Stop reason: SDUPTHER

## 2022-08-05 DIAGNOSIS — F33.1 MODERATE EPISODE OF RECURRENT MAJOR DEPRESSIVE DISORDER (HCC): ICD-10-CM

## 2022-08-05 NOTE — TELEPHONE ENCOUNTER
[facility] pharmacy is seeking a 90 day supply of the patient's medication.

## 2022-08-10 RX ORDER — BUPROPION HYDROCHLORIDE 150 MG/1
150 TABLET ORAL
Qty: 180 TABLET | Refills: 0 | Status: SHIPPED | OUTPATIENT
Start: 2022-08-10

## 2022-08-20 ENCOUNTER — HOSPITAL ENCOUNTER (EMERGENCY)
Age: 47
Discharge: HOME | End: 2022-08-20
Attending: STUDENT IN AN ORGANIZED HEALTH CARE EDUCATION/TRAINING PROGRAM
Payer: COMMERCIAL

## 2022-08-20 ENCOUNTER — APPOINTMENT (OUTPATIENT)
Dept: GENERAL RADIOLOGY | Age: 47
End: 2022-08-20
Attending: PHYSICIAN ASSISTANT
Payer: COMMERCIAL

## 2022-08-20 VITALS
DIASTOLIC BLOOD PRESSURE: 116 MMHG | SYSTOLIC BLOOD PRESSURE: 156 MMHG | HEART RATE: 106 BPM | OXYGEN SATURATION: 98 % | RESPIRATION RATE: 18 BRPM | TEMPERATURE: 98.8 F

## 2022-08-20 DIAGNOSIS — R05.3 CHRONIC COUGH: Primary | ICD-10-CM

## 2022-08-20 PROCEDURE — 99283 EMERGENCY DEPT VISIT LOW MDM: CPT

## 2022-08-20 PROCEDURE — 71046 X-RAY EXAM CHEST 2 VIEWS: CPT

## 2022-08-20 RX ORDER — BENZONATATE 100 MG/1
100 CAPSULE ORAL
Qty: 30 CAPSULE | Refills: 0 | Status: SHIPPED | OUTPATIENT
Start: 2022-08-20 | End: 2022-08-27

## 2022-08-20 RX ORDER — PREDNISONE 10 MG/1
TABLET ORAL
Qty: 21 TABLET | Refills: 0 | OUTPATIENT
Start: 2022-08-20 | End: 2022-10-23

## 2022-08-20 NOTE — ED PROVIDER NOTES
EMERGENCY DEPARTMENT HISTORY AND PHYSICAL EXAM    Date: 8/20/2022  Patient Name: Ace Rodriguez    History of Presenting Illness     Chief Complaint   Patient presents with    Cough         History Provided By: Patient    Chief Complaint: cough   Duration: 2-3 months   Timing:  chronic   Location: chest   Quality: productive   Severity: moderate  Modifying Factors:taking mucinex with little relief   Associated Symptoms: none       Additional History (Context): Ace Rodriguez is a 50 y.o. male with no documented PMH who presents with c/o a cough productive of white sputum x 2-3 months. Denies chest pain and SOB. Pt states he recently quit smoking a week ago. No sick exposures or concerns for covid. Pt states he has taken mucinex with little relief. No other complaints reported. PCP: Jefferson Rankin MD    Current Outpatient Medications   Medication Sig Dispense Refill    predniSONE (STERAPRED DS) 10 mg dose pack Use as directed 21 Tablet 0    benzonatate (Tessalon Perles) 100 mg capsule Take 1 Capsule by mouth three (3) times daily as needed for Cough for up to 7 days. 30 Capsule 0    buPROPion XL (WELLBUTRIN XL) 150 mg tablet Take 1 Tablet by mouth every morning. Take 1 tab by mouth daily for 1 week, then 2 tabs daily thereafter. 180 Tablet 0    aspirin delayed-release 81 mg tablet Take 1 Tablet by mouth daily. 30 Tablet 6    amLODIPine (NORVASC) 5 mg tablet       atorvastatin (LIPITOR) 10 mg tablet       ergocalciferol (ERGOCALCIFEROL) 50,000 unit capsule          Past History     Past Medical History:  No past medical history on file. Past Surgical History:  No past surgical history on file. Family History:  No family history on file. Social History:  Social History     Tobacco Use    Smoking status: Some Days     Types: Cigars    Smokeless tobacco: Never    Tobacco comments:     \"black and mild\"   Substance Use Topics    Alcohol use:  Yes     Alcohol/week: 2.0 standard drinks     Types: 2 Cans of beer per week       Allergies: Allergies   Allergen Reactions    Penicillin Other (comments)     HA    Pcn [Penicillins] Other (comments)     headache         Review of Systems   Review of Systems   Constitutional: Negative. Negative for chills and fever. HENT: Negative. Negative for congestion, ear pain and rhinorrhea. Eyes: Negative. Negative for pain and redness. Respiratory:  Positive for cough. Negative for shortness of breath, wheezing and stridor. Cardiovascular: Negative. Negative for chest pain and leg swelling. Gastrointestinal: Negative. Negative for abdominal pain, constipation, diarrhea, nausea and vomiting. Genitourinary: Negative. Negative for dysuria and frequency. Musculoskeletal: Negative. Negative for back pain and neck pain. Skin: Negative. Negative for rash and wound. Neurological: Negative. Negative for dizziness, seizures, syncope and headaches. All other systems reviewed and are negative. All Other Systems Negative  Physical Exam     Vitals:    08/20/22 1301   BP: (!) 156/116   Pulse: (!) 106   Resp: 18   Temp: 98.8 °F (37.1 °C)   SpO2: 98%     Physical Exam  Vitals and nursing note reviewed. Constitutional:       General: He is not in acute distress. Appearance: He is well-developed. He is not diaphoretic. HENT:      Head: Normocephalic and atraumatic. Eyes:      General: No scleral icterus. Right eye: No discharge. Left eye: No discharge. Conjunctiva/sclera: Conjunctivae normal.   Cardiovascular:      Rate and Rhythm: Normal rate and regular rhythm. Heart sounds: Normal heart sounds. No murmur heard. No friction rub. No gallop. Comments: Slightly tachycardiac in triage, normal rate on auscultation   Pulmonary:      Effort: Pulmonary effort is normal. No respiratory distress. Breath sounds: Normal breath sounds. No stridor. No wheezing or rales. Musculoskeletal:         General: Normal range of motion.       Cervical back: Normal range of motion and neck supple. Skin:     General: Skin is warm and dry. Findings: No erythema or rash. Neurological:      General: No focal deficit present. Mental Status: He is alert and oriented to person, place, and time. Mental status is at baseline. Coordination: Coordination normal.      Comments: Gait is steady and patient exhibits no evidence of ataxia. Patient is able to ambulate without difficulty. No focal neurological deficit noted. No facial droop, slurred speech, or evidence of altered mentation noted on exam.       Psychiatric:         Behavior: Behavior normal.         Thought Content: Thought content normal.              Diagnostic Study Results     Labs -   No results found for this or any previous visit (from the past 12 hour(s)). Radiologic Studies -   XR CHEST PA LAT   Final Result      No acute or active disease evident. CT Results  (Last 48 hours)      None          CXR Results  (Last 48 hours)                 08/20/22 1339  XR CHEST PA LAT Final result    Impression:      No acute or active disease evident. Narrative:  CHEST PA AND LATERAL:           INDICATIONS: Cough. COMPARISONS: None. FINDINGS:        Two views are obtained . Lungs: Clear. Cardiac Silhouette And Mediastinal Contours: Normal.       Pleural Spaces: No pneumothorax or pleural effusion evident. Bones And Soft Tissues: Unremarkable for age. Medical Decision Making   I am the first provider for this patient. I reviewed the vital signs, available nursing notes, past medical history, past surgical history, family history and social history. Vital Signs-Reviewed the patient's vital signs. Records Reviewed: Cira Cabrera PA-C     Procedures:  Procedures    Provider Notes (Medical Decision Making):  Impression:  cough     Chest x-ray negative for acute process    Will plan to d/c with varsha tsang prednisone. Pcp follow-up. Pt agrees. Cira Cabrera PA-C     MED RECONCILIATION:  No current facility-administered medications for this encounter. Current Outpatient Medications   Medication Sig    predniSONE (STERAPRED DS) 10 mg dose pack Use as directed    benzonatate (Tessalon Perles) 100 mg capsule Take 1 Capsule by mouth three (3) times daily as needed for Cough for up to 7 days. buPROPion XL (WELLBUTRIN XL) 150 mg tablet Take 1 Tablet by mouth every morning. Take 1 tab by mouth daily for 1 week, then 2 tabs daily thereafter. aspirin delayed-release 81 mg tablet Take 1 Tablet by mouth daily. amLODIPine (NORVASC) 5 mg tablet     atorvastatin (LIPITOR) 10 mg tablet     ergocalciferol (ERGOCALCIFEROL) 50,000 unit capsule        Disposition:  D/c    DISCHARGE NOTE: Patient is stable for discharge at this time. I have discussed all the findings from today's work up with the patient, including lab results and imaging. I have answered all questions. Rx for prednisone and tessalon given. Rest and close follow-up with the PCP recommended this week. Return to the ED immediately for any new or worsening symptoms. Follow-up Information       Follow up With Specialties Details Why Contact Info    Herson Rosales MD Family Medicine In 1 week  2020 [address] [address]  [phone]      [facility] EMERGENCY DEPT Emergency Medicine  As needed, If symptoms worsen [address] [facility] [address]  [phone]            Current Discharge Medication List        START taking these medications    Details   predniSONE (STERAPRED DS) 10 mg dose pack Use as directed  Qty: 21 Tablet, Refills: 0  Start date: 8/20/2022      benzonatate (Tessalon Perles) 100 mg capsule Take 1 Capsule by mouth three (3) times daily as needed for Cough for up to 7 days. Qty: 30 Capsule, Refills: 0  Start date: 8/20/2022, End date: 8/27/2022                 Diagnosis     Clinical Impression:   1. Chronic cough

## 2022-08-20 NOTE — ED TRIAGE NOTES
51 yo M to ED for cough x2-3 months, has been taking meds without relief. Productive cough with white sputum. Pt recently quit smoking.  Aox4, ambulatory, respirations even and unlabored

## 2022-08-20 NOTE — DISCHARGE INSTRUCTIONS
[employer]iolase Activation    Thank you for requesting access to [employer]. Please follow the instructions below to securely access and download your online medical record. [employer]ase allows you to send messages to your doctor, view your test results, renew your prescriptions, schedule appointments, and more. How Do I Sign Up? In your internet browser, go to [email or web address]  Click on the First Time User? Click Here link in the Sign In box. You will be redirect to the New Member Sign Up page. Enter your [employer]ase Access Code exactly as it appears below. You will not need to use this code after youve completed the sign-up process. If you do not sign up before the expiration date, you must request a new code. Biolase Access Code: Sherel Osler  Expires: 10/4/2022  1:01 PM (This is the date your Biolase access code will )    Enter the last four digits of your Social Security Number (xxxx) and Date of Birth (mm/dd/yyyy) as indicated and click Submit. You will be taken to the next sign-up page. Create a Biolase ID. This will be your Biolase login ID and cannot be changed, so think of one that is secure and easy to remember. Create a Biolase password. You can change your password at any time. Enter your Password Reset Question and Answer. This can be used at a later time if you forget your password. Enter your e-mail address. You will receive e-mail notification when new information is available in [address] Click Sign Up. You can now view and download portions of your medical record. Click the [employer] link to download a portable copy of your medical information. Additional Information    If you have questions, please visit the Frequently Asked Questions section of the [employer]ase website at [email or web address] Remember, Biolase is NOT to be used for urgent needs. For medical emergencies, dial 911.

## 2022-10-23 ENCOUNTER — APPOINTMENT (OUTPATIENT)
Dept: GENERAL RADIOLOGY | Age: 47
End: 2022-10-23
Attending: PHYSICIAN ASSISTANT
Payer: MEDICARE

## 2022-10-23 ENCOUNTER — HOSPITAL ENCOUNTER (EMERGENCY)
Age: 47
Discharge: HOME | End: 2022-10-23
Attending: EMERGENCY MEDICINE
Payer: MEDICARE

## 2022-10-23 VITALS
DIASTOLIC BLOOD PRESSURE: 96 MMHG | WEIGHT: 256 LBS | TEMPERATURE: 99 F | HEART RATE: 98 BPM | HEIGHT: 76 IN | RESPIRATION RATE: 20 BRPM | OXYGEN SATURATION: 97 % | BODY MASS INDEX: 31.17 KG/M2 | SYSTOLIC BLOOD PRESSURE: 161 MMHG

## 2022-10-23 DIAGNOSIS — E87.6 HYPOKALEMIA: ICD-10-CM

## 2022-10-23 DIAGNOSIS — R05.9 COUGH, UNSPECIFIED TYPE: ICD-10-CM

## 2022-10-23 DIAGNOSIS — U07.1 COVID: Primary | ICD-10-CM

## 2022-10-23 LAB
ANION GAP SERPL CALC-SCNC: 5 MMOL/L (ref 3–18)
BASOPHILS # BLD: 0 K/UL (ref 0–0.1)
BASOPHILS NFR BLD: 1 % (ref 0–2)
BUN SERPL-MCNC: 14 MG/DL (ref 7–18)
BUN/CREAT SERPL: 11 (ref 12–20)
CALCIUM SERPL-MCNC: 9.2 MG/DL (ref 8.5–10.1)
CHLORIDE SERPL-SCNC: 107 MMOL/L (ref 100–111)
CO2 SERPL-SCNC: 30 MMOL/L (ref 21–32)
CREAT SERPL-MCNC: 1.32 MG/DL (ref 0.6–1.3)
DIFFERENTIAL METHOD BLD: ABNORMAL
EOSINOPHIL # BLD: 0 K/UL (ref 0–0.4)
EOSINOPHIL NFR BLD: 1 % (ref 0–5)
ERYTHROCYTE [DISTWIDTH] IN BLOOD BY AUTOMATED COUNT: 14.2 % (ref 11.6–14.5)
FLUAV RNA SPEC QL NAA+PROBE: NOT DETECTED
FLUBV RNA SPEC QL NAA+PROBE: NOT DETECTED
GLUCOSE SERPL-MCNC: 129 MG/DL (ref 74–99)
HCT VFR BLD AUTO: 43.2 % (ref 36–48)
HGB BLD-MCNC: 14.2 G/DL (ref 13–16)
IMM GRANULOCYTES # BLD AUTO: 0 K/UL (ref 0–0.04)
IMM GRANULOCYTES NFR BLD AUTO: 1 % (ref 0–0.5)
LYMPHOCYTES # BLD: 1 K/UL (ref 0.9–3.6)
LYMPHOCYTES NFR BLD: 20 % (ref 21–52)
MCH RBC QN AUTO: 29 PG (ref 24–34)
MCHC RBC AUTO-ENTMCNC: 32.9 G/DL (ref 31–37)
MCV RBC AUTO: 88.2 FL (ref 78–100)
MONOCYTES # BLD: 1 K/UL (ref 0.05–1.2)
MONOCYTES NFR BLD: 19 % (ref 3–10)
NEUTS SEG # BLD: 3.1 K/UL (ref 1.8–8)
NEUTS SEG NFR BLD: 59 % (ref 40–73)
NRBC # BLD: 0 K/UL (ref 0–0.01)
NRBC BLD-RTO: 0 PER 100 WBC
PLATELET # BLD AUTO: 202 K/UL (ref 135–420)
PMV BLD AUTO: 11 FL (ref 9.2–11.8)
POTASSIUM SERPL-SCNC: 3.1 MMOL/L (ref 3.5–5.5)
RBC # BLD AUTO: 4.9 M/UL (ref 4.35–5.65)
SARS-COV-2, COV2: DETECTED
SODIUM SERPL-SCNC: 142 MMOL/L (ref 136–145)
WBC # BLD AUTO: 5.2 K/UL (ref 4.6–13.2)

## 2022-10-23 PROCEDURE — 85025 COMPLETE CBC W/AUTO DIFF WBC: CPT

## 2022-10-23 PROCEDURE — 80048 BASIC METABOLIC PNL TOTAL CA: CPT

## 2022-10-23 PROCEDURE — 87636 SARSCOV2 & INF A&B AMP PRB: CPT

## 2022-10-23 PROCEDURE — 74011250637 HC RX REV CODE- 250/637: Performed by: PHYSICIAN ASSISTANT

## 2022-10-23 PROCEDURE — 71046 X-RAY EXAM CHEST 2 VIEWS: CPT

## 2022-10-23 PROCEDURE — 99284 EMERGENCY DEPT VISIT MOD MDM: CPT

## 2022-10-23 RX ORDER — POTASSIUM CHLORIDE 20 MEQ/1
20 TABLET, EXTENDED RELEASE ORAL 3 TIMES DAILY
Qty: 9 TABLET | Refills: 0 | Status: SHIPPED | OUTPATIENT
Start: 2022-10-23 | End: 2022-10-26

## 2022-10-23 RX ORDER — POTASSIUM CHLORIDE 20 MEQ/1
40 TABLET, EXTENDED RELEASE ORAL
Status: COMPLETED | OUTPATIENT
Start: 2022-10-23 | End: 2022-10-23

## 2022-10-23 RX ADMIN — POTASSIUM CHLORIDE 40 MEQ: 1500 TABLET, EXTENDED RELEASE ORAL at 13:00

## 2022-10-23 NOTE — ED PROVIDER NOTES
EMERGENCY DEPARTMENT HISTORY AND PHYSICAL EXAM    12:22 PM      Date: 10/23/2022  Patient Name: Cayden Haley    History of Presenting Illness     Chief Complaint   Patient presents with    Cough         History Provided By: Patient    Additional History (Context): Cayden Haley is a 50 y.o. male with  hx of depression, HTN, HLD, and other noted PMH  who presents with complaint of intermittent cough x 5 months. Patient's symptoms started after he quit smoking. Patient notes he may have white sputum and it may be dry. Denies exacerbating or alleviating factors. Patient denies fever or chills, chest pain, shortness of breath, orthopnea, abdominal pain, nausea or vomiting. Patient notes he has tried over-the-counter medication for symptoms without relief. Patient notes he is fully vaccinated for COVID. Patient denies history of CHF, PE, DVT, recent surgery or travel. PCP: Deedee Mccormack MD    Current Outpatient Medications   Medication Sig Dispense Refill    potassium chloride (K-DUR, KLOR-CON M20) 20 mEq tablet Take 1 Tablet by mouth three (3) times daily for 3 days. 9 Tablet 0    buPROPion XL (WELLBUTRIN XL) 150 mg tablet Take 1 Tablet by mouth every morning. Take 1 tab by mouth daily for 1 week, then 2 tabs daily thereafter. 180 Tablet 0    aspirin delayed-release 81 mg tablet Take 1 Tablet by mouth daily. 30 Tablet 6    amLODIPine (NORVASC) 5 mg tablet       atorvastatin (LIPITOR) 10 mg tablet       ergocalciferol (ERGOCALCIFEROL) 50,000 unit capsule          Past History     Past Medical History:  No past medical history on file. Past Surgical History:  No past surgical history on file. Family History:  No family history on file. Social History:  Social History     Tobacco Use    Smoking status: Some Days     Types: Cigars    Smokeless tobacco: Never    Tobacco comments:     \"black and mild\"   Substance Use Topics    Alcohol use:  Yes     Alcohol/week: 2.0 standard drinks     Types: 2 Cans of beer per week       Allergies: Allergies   Allergen Reactions    Penicillin Other (comments)     HA    Pcn [Penicillins] Other (comments)     headache         Review of Systems       Review of Systems   Constitutional:  Negative for chills and fever. Respiratory:  Positive for cough. Negative for shortness of breath. Cardiovascular:  Negative for chest pain. Gastrointestinal:  Negative for abdominal pain, nausea and vomiting. Skin:  Negative for rash. Neurological:  Negative for weakness. All other systems reviewed and are negative. Physical Exam   Visit Vitals  BP (!) 161/96 (BP 1 Location: Left upper arm)   Pulse 98   Temp 99 °F (37.2 °C)   Resp 20   Ht 6' 4\" (1.93 m)   Wt 116.1 kg (256 lb)   SpO2 97%   BMI 31.16 kg/m²         Physical Exam  Vitals and nursing note reviewed. Constitutional:       General: He is not in acute distress. Appearance: He is well-developed. He is obese. He is not ill-appearing, toxic-appearing or diaphoretic. HENT:      Head: Normocephalic and atraumatic. Cardiovascular:      Rate and Rhythm: Normal rate and regular rhythm. Heart sounds: Normal heart sounds. No murmur heard. No friction rub. No gallop. Pulmonary:      Effort: Pulmonary effort is normal. No respiratory distress. Breath sounds: Normal breath sounds. No wheezing or rales. Musculoskeletal:         General: Normal range of motion. Cervical back: Normal range of motion and neck supple. No rigidity. Right lower leg: No edema. Left lower leg: No edema. Lymphadenopathy:      Cervical: No cervical adenopathy. Skin:     General: Skin is warm. Findings: No rash. Neurological:      Mental Status: He is alert.          Diagnostic Study Results     Labs -  Recent Results (from the past 12 hour(s))   COVID-19 WITH INFLUENZA A/B    Collection Time: 10/23/22 11:54 AM   Result Value Ref Range    SARS-CoV-2 by PCR Detected (AA) NOTD      Influenza A by PCR Not detected NOTD Influenza B by PCR Not detected NOTD     CBC WITH AUTOMATED DIFF    Collection Time: 10/23/22 12:05 PM   Result Value Ref Range    WBC 5.2 4.6 - 13.2 K/uL    RBC 4.90 4.35 - 5.65 M/uL    HGB 14.2 13.0 - 16.0 g/dL    HCT 43.2 36.0 - 48.0 %    MCV 88.2 78.0 - 100.0 FL    MCH 29.0 24.0 - 34.0 PG    MCHC 32.9 31.0 - 37.0 g/dL    RDW 14.2 11.6 - 14.5 %    PLATELET 375 306 - 484 K/uL    MPV 11.0 9.2 - 11.8 FL    NRBC 0.0 0  WBC    ABSOLUTE NRBC 0.00 0.00 - 0.01 K/uL    NEUTROPHILS 59 40 - 73 %    LYMPHOCYTES 20 (L) 21 - 52 %    MONOCYTES 19 (H) 3 - 10 %    EOSINOPHILS 1 0 - 5 %    BASOPHILS 1 0 - 2 %    IMMATURE GRANULOCYTES 1 (H) 0.0 - 0.5 %    ABS. NEUTROPHILS 3.1 1.8 - 8.0 K/UL    ABS. LYMPHOCYTES 1.0 0.9 - 3.6 K/UL    ABS. MONOCYTES 1.0 0.05 - 1.2 K/UL    ABS. EOSINOPHILS 0.0 0.0 - 0.4 K/UL    ABS. BASOPHILS 0.0 0.0 - 0.1 K/UL    ABS. IMM. GRANS. 0.0 0.00 - 0.04 K/UL    DF AUTOMATED     METABOLIC PANEL, BASIC    Collection Time: 10/23/22 12:05 PM   Result Value Ref Range    Sodium 142 136 - 145 mmol/L    Potassium 3.1 (L) 3.5 - 5.5 mmol/L    Chloride 107 100 - 111 mmol/L    CO2 30 21 - 32 mmol/L    Anion gap 5 3.0 - 18 mmol/L    Glucose 129 (H) 74 - 99 mg/dL    BUN 14 7.0 - 18 MG/DL    Creatinine 1.32 (H) 0.6 - 1.3 MG/DL    BUN/Creatinine ratio 11 (L) 12 - 20      eGFR >60 >60 ml/min/1.73m2    Calcium 9.2 8.5 - 10.1 MG/DL       Radiologic Studies -   XR CHEST PA LAT   Final Result      Negative chest.            Medical Decision Making   I am the first provider for this patient. I reviewed the vital signs, available nursing notes, past medical history, past surgical history, family history and social history. Vital Signs-Reviewed the patient's vital signs.     Pulse Oximetry Analysis -   97% on room air     Records Reviewed: Nursing Notes, Old Medical Records, Previous Radiology Studies, and Previous Laboratory Studies (Time of Review: 12:22 PM)    ED Course: Progress Notes, Reevaluation, and Consults:  1:00 PM Reviewed results and plan with patient. Discussed need for close outpatient follow-up this week for reassessment for chronic cough. Discussed strict return precautions, including fever, chest pain, shortness of breath, or any other medical concerns. Patient in agreement with plan, ready to go home. Provider Notes (Medical Decision Making): 68-year-old male who presents the ED due to intermittent cough x5 months. Afebrile, nontoxic-appearing, looks well. Lungs clear to auscultation bilaterally. Labs demonstrate mild hypokalemia. Chest x-ray without evidence of acute process. COVID positive. Patient is vaccinated. Patient looks well. No evidence of persistent tachycardia, tachypnea, hypoxia. Patient is stable for discharge with symptomatic management, close outpatient follow-up for further assessment. Strict return precautions provided. Diagnosis     Clinical Impression:   1. COVID    2. Hypokalemia    3. Cough, unspecified type        Disposition: home     Follow-up Information       Follow up With Specialties Details Why [address] [facility] EMERGENCY DEPT Emergency Medicine  If symptoms worsen [address]  [phone]    Lance Whittaker MD Family Medicine Schedule an appointment as soon as possible for a visit   [address] [place] [address]  [phone]               Patient's Medications   Start Taking    POTASSIUM CHLORIDE (K-DUR, KLOR-CON M20) 20 MEQ TABLET    Take 1 Tablet by mouth three (3) times daily for 3 days. Continue Taking    AMLODIPINE (NORVASC) 5 MG TABLET        ASPIRIN DELAYED-RELEASE 81 MG TABLET    Take 1 Tablet by mouth daily. ATORVASTATIN (LIPITOR) 10 MG TABLET        BUPROPION XL (WELLBUTRIN XL) 150 MG TABLET    Take 1 Tablet by mouth every morning. Take 1 tab by mouth daily for 1 week, then 2 tabs daily thereafter.     ERGOCALCIFEROL (ERGOCALCIFEROL) 50,000 UNIT CAPSULE       These Medications have changed    No medications on file   Stop Taking    PREDNISONE (STERAPRED DS) 10 MG DOSE PACK    Use as directed       Dictation disclaimer:  Please note that this dictation was completed with [employer], the computer voice recognition software. Quite often unanticipated grammatical, syntax, homophones, and other interpretive errors are inadvertently transcribed by the computer software. Please disregard these errors. Please excuse any errors that have escaped final proofreading.

## 2022-10-23 NOTE — Clinical Note
[address] [place] [address] Dr [facility] EMERGENCY DEPT  [address] [address] [phone]    Work/School Note    Date: 10/23/2022     To Whom It May concern:    Tara Hopkins was evaluated by the following provider(s):  Attending Provider: Carmen Ramos DO  Physician Assistant: Joan Montes, [address] virus is suspected. Per the CDC guidelines we recommend home isolation until the following conditions are all met:    1. At least five days have passed since symptoms first appeared and/or had a close exposure,   2. After home isolation for five days, wearing a mask around others for the next five days,  3. At least 24 have passed since last fever without the use of fever-reducing medications and  4.  Symptoms (eg cough, shortness of breath) have improved      Sincerely,          LYNDA Betancourt

## 2022-10-23 NOTE — Clinical Note
[address] [place] [address] Dr [facility] EMERGENCY DEPT  [address] [address] [phone]    Work/School Note    Date: 10/23/2022     To Whom It May concern:    Barber Quintero was evaluated by the following provider(s):  Attending Provider: Ronnell Brooks DO  Physician Assistant: Camila Ibrahim, [address] [place] [address] virus is suspected. Per the CDC guidelines we recommend home isolation until the following conditions are all met:    1. At least five days have passed since symptoms first appeared and/or had a close exposure,   2. After home isolation for five days, wearing a mask around others for the next five days,  3. At least 24 have passed since last fever without the use of fever-reducing medications and  4.  Symptoms (eg cough, shortness of breath) have improved      Sincerely,          LYNDA Ferrer

## 2023-01-18 PROBLEM — R41.82 ALTERED MENTAL STATUS: Status: ACTIVE | Noted: 2018-10-02

## 2023-01-18 PROBLEM — R40.4 ALTERED LEVEL OF CONSCIOUSNESS: Status: ACTIVE | Noted: 2018-10-02

## 2023-01-18 PROBLEM — G04.90 ENCEPHALITIS: Status: ACTIVE | Noted: 2023-01-18

## 2023-02-27 ENCOUNTER — OFFICE VISIT (OUTPATIENT)
Age: 48
End: 2023-02-27
Payer: MEDICARE

## 2023-02-27 VITALS
RESPIRATION RATE: 18 BRPM | HEIGHT: 76 IN | BODY MASS INDEX: 31.15 KG/M2 | OXYGEN SATURATION: 96 % | WEIGHT: 255.8 LBS | DIASTOLIC BLOOD PRESSURE: 76 MMHG | HEART RATE: 96 BPM | SYSTOLIC BLOOD PRESSURE: 118 MMHG

## 2023-02-27 DIAGNOSIS — I67.9 CEREBROVASCULAR DISEASE, UNSPECIFIED: ICD-10-CM

## 2023-02-27 DIAGNOSIS — G31.84 COGNITIVE IMPAIRMENT, MILD, SO STATED: Primary | ICD-10-CM

## 2023-02-27 DIAGNOSIS — F41.8 OTHER SPECIFIED ANXIETY DISORDERS: ICD-10-CM

## 2023-02-27 DIAGNOSIS — F33.1 MAJOR DEPRESSIVE DISORDER, RECURRENT, MODERATE (HCC): ICD-10-CM

## 2023-02-27 PROCEDURE — G8484 FLU IMMUNIZE NO ADMIN: HCPCS | Performed by: NURSE PRACTITIONER

## 2023-02-27 PROCEDURE — G8417 CALC BMI ABV UP PARAM F/U: HCPCS | Performed by: NURSE PRACTITIONER

## 2023-02-27 PROCEDURE — 3078F DIAST BP <80 MM HG: CPT | Performed by: NURSE PRACTITIONER

## 2023-02-27 PROCEDURE — G8427 DOCREV CUR MEDS BY ELIG CLIN: HCPCS | Performed by: NURSE PRACTITIONER

## 2023-02-27 PROCEDURE — 99213 OFFICE O/P EST LOW 20 MIN: CPT | Performed by: NURSE PRACTITIONER

## 2023-02-27 PROCEDURE — 4004F PT TOBACCO SCREEN RCVD TLK: CPT | Performed by: NURSE PRACTITIONER

## 2023-02-27 PROCEDURE — 3074F SYST BP LT 130 MM HG: CPT | Performed by: NURSE PRACTITIONER

## 2023-02-27 RX ORDER — ALBUTEROL SULFATE 90 UG/1
AEROSOL, METERED RESPIRATORY (INHALATION)
COMMUNITY
Start: 2023-02-19

## 2023-02-27 RX ORDER — BUPROPION HYDROCHLORIDE 150 MG/1
150 TABLET ORAL EVERY MORNING
Qty: 30 TABLET | Refills: 6 | Status: SHIPPED | OUTPATIENT
Start: 2023-02-27

## 2023-02-27 RX ORDER — FLUTICASONE PROPIONATE 50 MCG
BLISTER, WITH INHALATION DEVICE INHALATION
COMMUNITY
Start: 2023-02-19

## 2023-02-27 NOTE — PATIENT INSTRUCTIONS
Patient instructions:  -continue bupropion/Wellbutrin  -encouraged exercise, reading  -aspirin 81 mg daily  -continue to follow up with primary care for blood vessel risk factor management  -let me know if interested in sleep specialist referral or psychiatry referral  -neuropsychology re-evaluation referral- [facility]  [phone]- call if you did not hear in 1 week  -follow up in 6 months

## 2023-02-27 NOTE — PROGRESS NOTES
[address] [address]. [facility] [place], [address]  Office:  [phone]  Fax: [phone]  Chief Complaint   Patient presents with    Follow-up       HPI: Anatoly Chambers presents in follow-up for mild cognitive impairment. He had impaired cognition after an episode of encephalopathy in October 2018 in which the etiology of the encephalopathic episode was not entirely clear. He was last seen here on 3/29/202. At that time we discussed impressions and recommendations from the repeat neuropsychological testing with report from October 2021 which noted diagnostic impressions of mild neurocognitive disorder and major depressive disorder, moderate. It was noted that his behavior change could be secondary to depression or either ADHD complicated by underlying psychiatric difficulties or an evolving cognitive disorder that involves the behavioral syndrome, also with underlying psychiatric difficulties. Bupropion was started which he was on in the past and she reported he tolerated it, but it was no longer seeing psychiatry. Following back up with psychiatry and psychology was encouraged. Recommended sleep evaluation as well but they held off at that time. He presents today in follow-up. He is with his wife. He quit smoking in the interim; he had been developing a smoker's cough. He continues the Wellbutrin XL. He is taking 150 mg daily. Did not increase to 2 tablets daily. She has a concern that he develops a headache right frontal, but he says no, only once in a blue moon. Not severe. It is minor. No associated photo- or phonophobia, N/V, weakness. Lasts maybe a few minutes. Takes a baby aspirin for it. It is not occurring every day. He tries not to stress. Says he mainly watches tv at home, cleans up. Does not drive still. Says there is not much to do. She says he usually walks behind her, gets tired. He says he's mainly in the house. Denies SOB with walking. He reports his knees and lower back bother him after a while.  There are local gyms.  They tried with psychiatry, but she says they had a hard time getting in.  Has daytime fatigue, naps in the day time, says there is not much else to do.  Denies snoring or apneic spells in sleep.  She reports he lost weight; found out diagnosis of diabetes.  He started metformin.  Takes aspirin 81 mg.  Doing ok mental health wise.  Reports speaking several languages.  She wants him to be able to be more independent with being able to deal with the finances if needed.  He takes his medications independently.      From previous encounter on 3/29/2022:  \"Ananth Camilo presents in follow-up for cognitive changes after an episode of encephalopathy in October 2018.  He was hospitalized for 1 month in [facility] at that time.  Since that episode there was a dramatic decrease in his functional capacity.  It was noted that he appeared to be psychomotor slow.  His last MRI of the brain was from June 2019 which reported chronic left basal ganglia infarct with hemosiderin staining from associated hemorrhage, and mild to moderate amount of white matter disease.  EEG in July 2019 was normal.  He underwent neuropsych testing in March 2020.  It was noted that the cognitive decline was nonspecific with etiology pointing to his episode of encephalitis.  He started on a low-dose of Aricept thereafter.  At the visit on 7/9/2020, it was noted that there will have been some improvements since starting Aricept.  The Aricept was increased to 10 mg daily.  Labs were to be obtained including CBC, CMP, ammonia, vitamin B12 level.  He was referred for sleep evaluation due to the sleep concerns as well as cognitive complaints.  He was referred to physical therapy due to his gait imbalance and mobility issues and an assistive device can be considered.  He continues to see psychiatry.  Social work was going to be considered due to recommendation after neuropsych  evaluation, and they noted they would like to do this when they get a new place. At the virtual visit in September 2020 his wife noted that the Aricept is helping. She believes the increased dose has been helpful. He is remembering things like cleaning up better. There was some problems with focus or inattention noted. There was difficulty getting to physical therapy due to car problems, etc.  Labs were still pending at that time. Sleep evaluation was pending. He was seen here on 1/29/2021 in virtual video visit. His cognition seemed to be improving over time and they noted that their new living situation was helpful. He continued on Aricept. He was on Depakote for mood and follows with psychiatry. He went to therapy but they did not find it beneficial.  Previously ordered labs were not done yet so we planned to have these and then repeat the neuropsych evaluation at 1 year from prior, in March. Encouraged management of vascular risk factors. He was last seen here on 7/14/2021. He was in the ER in June 2021 for a headache. At the visit he reported no more headaches. He had a migraine at that time. His tooth was acting up at the time. CT noted frontal and parietal lobe white matter lucency, may be chronic ischemic microvascular disease, somewhat out of proportion to patient's age, so MRI of the head could be obtained not emergently. He denied worsening cognition and overall has been improving since the hospitalization over time but still some cognitive concerns. He continued on Aricept. The neuropsych evaluation with Dr. Wellington [address] was to be repeated. He was to resume aspirin 81 mg. He wanted to hold off on MRI and MRA due to claustrophobia. It was noted that he had been off Aricept for the past few months and they denied worsening cognition and overall he has been improving over time since the hospitalization so we awaited testing prior to restarting Aricept. He presents today in follow-up. He is with his wife. He is sleepy, napping in here upon entering. We did not have the sleep specialist evaluation. She received a copy of the report from the neuropsych eval.  She called to schedule with a psychiatrist but he refused it. He saw a psychiatrist, he reports it was a long time ago, but she said they no longer take his insurance. He denies depression or SI, just reports feeling sleepy. His sleep pattern varies. She reports he likes to stay up and watch tv. He was on Wellbutrin before (on list on EEG report from July 2019) and she reports he did well on it, but no longer was with a psychiatrist.  It was before the hospitalization in 2018. Initial /84. He did not take BP med yet today. She got a pillbox for his medications. He smokes 2 black and milds a day. Denies alcohol use. She would like him to let her know when he goes to the store. He does not drive. Reports PCP checked liver function and it was normal.  Reports he is no longer on lactulose. She is not sure if he had ammonia level. \"    No past medical history on file. No past surgical history on file.     Current Outpatient Medications   Medication Sig Dispense Refill    buPROPion (WELLBUTRIN XL) 150 MG extended release tablet Take 1 tablet by mouth every morning 30 tablet 6    amLODIPine (NORVASC) 5 MG tablet ceived the following from [facility]: Outside name: amLODIPine (NORVASC) 5 mg tablet      aspirin 81 MG EC tablet Take 81 mg by mouth daily      atorvastatin (LIPITOR) 10 MG tablet ceived the following from [facility]: Outside name: atorvastatin (LIPITOR) 10 mg tablet      ergocalciferol (ERGOCALCIFEROL) 1.25 MG (37056 UT) capsule ceived the following from [facility]: Outside name: ergocalciferol (ERGOCALCIFEROL) 50,000 unit capsule      albuterol sulfate HFA (PROVENTIL;VENTOLIN;PROAIR) 108 (90 Base) MCG/ACT inhaler       FLOVENT DISKUS 50 MCG/ACT AEPB metFORMIN (GLUCOPHAGE) 500 MG tablet Take 500 mg by mouth daily       No current facility-administered medications for this visit. Allergies   Allergen Reactions    Penicillins Other (See Comments)     headache  HA         Social History     Tobacco Use    Smoking status: Some Days    Smokeless tobacco: Never    Tobacco comments:     Quit smoking: \"black and mild\"   Substance Use Topics    Alcohol use: Yes     Alcohol/week: 2.0 standard drinks       No family history on file. Review of Systems:  GENERAL: Denies fever. +fatigue  CARDIAC: No CP or SOB  PULMONARY: No cough or SOB  MUSCULOSKELETAL: No new joint pain  NEURO: SEE HPI    Physical Examination:  /76 (Site: Left Upper Arm, Position: Sitting, Cuff Size: Large Adult)   Pulse 96   Resp 18   Ht 6' 4\" (1.93 m)   Wt 255 lb 12.8 oz (116 kg)   SpO2 96%   BMI 31.14 kg/m²     Alert, in NAD. Heart is regular. Oriented x3. Speech is fluent. Speech clear. EOMs are full, PERRL, VFFTC, no nystagmus. No facial asymmetry. Tongue is midline. Strength and tone are normal. No drift of the bilateral upper extremities. Fine finger movements symmetrical. FNF intact bilaterally. DTRs decreased, gait symmetric and steady. Impression/Plan: This is a 75-year-old right-handed male who presents in follow-up for mild cognitive impairment. He had impaired cognition after an episode of encephalopathy in October 2018 in which the etiology of the encephalopathic episode was not entirely clear. During that episode he had empiric IV steroids and empiric IVIG with reported improvement in his mental status. He has undergone neuropsychological evaluations in March 2020 and in October 2021. On last testing diagnostic impressions included mild neurocognitive disorder and major depressive disorder, moderate.   There was consideration for ADHD complicated by underlying psychiatric difficulties or an evolving cognitive disorder that involves a behavioral syndrome, also with underlying psychiatric difficulties. He had been restarted on bupropion XL. He endorses doing well on this. Continue his current regimen of bupropion  mg daily. He declines sleep specialist evaluation to rule out potential sleep disorder which can be contributing. Advised to let me know if interested in this or in interested in psychiatry referral.  We will proceed with neuropsychological reevaluation as it has been over 1 year since previous. They are interested in proceeding. Encouraged to implement routine exercise such as going to the local gym or taking walks to [employer] nearby. Encouraged engaging in activities to keep the mind stimulated. He reports mainly sleeping and watching TV during the day because there is not much to do. He is thinking about going on walks to [employer]. He does not want to go to the gym early in the morning but may be open to going on weekends or later in the day. Encouraged to take the aspirin 81 mg daily and continue follow up with primary care for vascular risk factor management. Follow up in 6 months. Weston Crawford was seen today for follow-up. Diagnoses and all orders for this visit:    Cognitive impairment, mild, so stated  -     buPROPion (WELLBUTRIN XL) 150 MG extended release tablet; Take 1 tablet by mouth every morning  -     Cancel: External Referral To Neuropsychology  -     External Referral To Neuropsychology    Major depressive disorder, recurrent, moderate (HCC)  -     buPROPion (WELLBUTRIN XL) 150 MG extended release tablet; Take 1 tablet by mouth every morning  -     Cancel: External Referral To Neuropsychology  -     External Referral To Neuropsychology    Other specified anxiety disorders  -     buPROPion (WELLBUTRIN XL) 150 MG extended release tablet;  Take 1 tablet by mouth every morning  -     Cancel: External Referral To Neuropsychology  -     External Referral To Neuropsychology    Cerebrovascular disease, unspecified  -     Cancel: External Referral To Neuropsychology  -     External Referral To Neuropsychology      Signed By: ABHINAV Escamilla NP        PLEASE NOTE:   Portions of this document may have been produced using voice recognition software. Unrecognized errors in transcription may be present.

## 2023-06-05 ENCOUNTER — HOSPITAL ENCOUNTER (EMERGENCY)
Facility: HOSPITAL | Age: 48
Discharge: HOME | End: 2023-06-05
Attending: EMERGENCY MEDICINE
Payer: MEDICARE

## 2023-06-05 ENCOUNTER — APPOINTMENT (OUTPATIENT)
Facility: HOSPITAL | Age: 48
End: 2023-06-05
Payer: MEDICARE

## 2023-06-05 VITALS
WEIGHT: 255 LBS | SYSTOLIC BLOOD PRESSURE: 130 MMHG | HEIGHT: 76 IN | TEMPERATURE: 98.2 F | HEART RATE: 79 BPM | RESPIRATION RATE: 16 BRPM | OXYGEN SATURATION: 97 % | DIASTOLIC BLOOD PRESSURE: 83 MMHG | BODY MASS INDEX: 31.05 KG/M2

## 2023-06-05 DIAGNOSIS — R42 DIZZINESS: Primary | ICD-10-CM

## 2023-06-05 LAB
ALBUMIN SERPL-MCNC: 4.1 G/DL (ref 3.4–5)
ALBUMIN/GLOB SERPL: 1.2 (ref 0.8–1.7)
ALP SERPL-CCNC: 94 U/L (ref 45–117)
ALT SERPL-CCNC: 33 U/L (ref 16–61)
ANION GAP SERPL CALC-SCNC: 4 MMOL/L (ref 3–18)
AST SERPL-CCNC: 20 U/L (ref 10–38)
BASOPHILS # BLD: 0 K/UL (ref 0–0.1)
BASOPHILS NFR BLD: 1 % (ref 0–2)
BILIRUB SERPL-MCNC: 0.5 MG/DL (ref 0.2–1)
BUN SERPL-MCNC: 15 MG/DL (ref 7–18)
BUN/CREAT SERPL: 12 (ref 12–20)
CALCIUM SERPL-MCNC: 9.6 MG/DL (ref 8.5–10.1)
CHLORIDE SERPL-SCNC: 104 MMOL/L (ref 100–111)
CO2 SERPL-SCNC: 30 MMOL/L (ref 21–32)
CREAT SERPL-MCNC: 1.22 MG/DL (ref 0.6–1.3)
DIFFERENTIAL METHOD BLD: ABNORMAL
EOSINOPHIL # BLD: 0.4 K/UL (ref 0–0.4)
EOSINOPHIL NFR BLD: 7 % (ref 0–5)
ERYTHROCYTE [DISTWIDTH] IN BLOOD BY AUTOMATED COUNT: 14.9 % (ref 11.6–14.5)
GLOBULIN SER CALC-MCNC: 3.4 G/DL (ref 2–4)
GLUCOSE SERPL-MCNC: 98 MG/DL (ref 74–99)
HCT VFR BLD AUTO: 47.1 % (ref 36–48)
HGB BLD-MCNC: 15.3 G/DL (ref 13–16)
IMM GRANULOCYTES # BLD AUTO: 0 K/UL (ref 0–0.04)
IMM GRANULOCYTES NFR BLD AUTO: 0 % (ref 0–0.5)
LYMPHOCYTES # BLD: 2.2 K/UL (ref 0.9–3.6)
LYMPHOCYTES NFR BLD: 36 % (ref 21–52)
MAGNESIUM SERPL-MCNC: 2.1 MG/DL (ref 1.6–2.6)
MCH RBC QN AUTO: 28.2 PG (ref 24–34)
MCHC RBC AUTO-ENTMCNC: 32.5 G/DL (ref 31–37)
MCV RBC AUTO: 86.9 FL (ref 78–100)
MONOCYTES # BLD: 0.4 K/UL (ref 0.05–1.2)
MONOCYTES NFR BLD: 6 % (ref 3–10)
NEUTS SEG # BLD: 3.1 K/UL (ref 1.8–8)
NEUTS SEG NFR BLD: 50 % (ref 40–73)
NRBC # BLD: 0 K/UL (ref 0–0.01)
NRBC BLD-RTO: 0 PER 100 WBC
PLATELET # BLD AUTO: 207 K/UL (ref 135–420)
PMV BLD AUTO: 11.5 FL (ref 9.2–11.8)
POTASSIUM SERPL-SCNC: 3.8 MMOL/L (ref 3.5–5.5)
PROT SERPL-MCNC: 7.5 G/DL (ref 6.4–8.2)
RBC # BLD AUTO: 5.42 M/UL (ref 4.35–5.65)
SODIUM SERPL-SCNC: 138 MMOL/L (ref 136–145)
TROPONIN I SERPL HS-MCNC: 15 NG/L (ref 0–78)
WBC # BLD AUTO: 6.1 K/UL (ref 4.6–13.2)

## 2023-06-05 PROCEDURE — 70450 CT HEAD/BRAIN W/O DYE: CPT

## 2023-06-05 PROCEDURE — 83735 ASSAY OF MAGNESIUM: CPT

## 2023-06-05 PROCEDURE — 80053 COMPREHEN METABOLIC PANEL: CPT

## 2023-06-05 PROCEDURE — 93005 ELECTROCARDIOGRAM TRACING: CPT | Performed by: EMERGENCY MEDICINE

## 2023-06-05 PROCEDURE — 99285 EMERGENCY DEPT VISIT HI MDM: CPT

## 2023-06-05 PROCEDURE — 71045 X-RAY EXAM CHEST 1 VIEW: CPT

## 2023-06-05 PROCEDURE — 85025 COMPLETE CBC W/AUTO DIFF WBC: CPT

## 2023-06-05 PROCEDURE — 84484 ASSAY OF TROPONIN QUANT: CPT

## 2023-06-05 PROCEDURE — 2580000003 HC RX 258: Performed by: PHYSICIAN ASSISTANT

## 2023-06-05 RX ORDER — 0.9 % SODIUM CHLORIDE 0.9 %
500 INTRAVENOUS SOLUTION INTRAVENOUS ONCE
Status: COMPLETED | OUTPATIENT
Start: 2023-06-05 | End: 2023-06-05

## 2023-06-05 RX ADMIN — SODIUM CHLORIDE 500 ML: 9 INJECTION, SOLUTION INTRAVENOUS at 18:07

## 2023-06-05 ASSESSMENT — ENCOUNTER SYMPTOMS
DIARRHEA: 0
SHORTNESS OF BREATH: 0
ABDOMINAL PAIN: 0
VOMITING: 0
NAUSEA: 0

## 2023-06-05 NOTE — ED PROVIDER NOTES
6:54 PM  Spoke with outgoing provider regarding head CT findings and she has documented phone conversation with radiologist with adding addendum that there is no acute intracranial hemorrhage. She does not feel that patient needs to have MRI as patient has been symptomatic for 3 days and is already improving. Has neurology follow-up with Leanne Rodrigues and will have patient seek that. - Benita sanchez 89, PA  06/05/23 2707
History     Past Medical History:  No past medical history on file. Past Surgical History:  No past surgical history on file. Family History:  No family history on file. Social History:  Social History     Tobacco Use    Smoking status: Some Days    Smokeless tobacco: Never    Tobacco comments:     Quit smoking: \"black and mild\"   Substance Use Topics    Alcohol use: Yes     Alcohol/week: 2.0 standard drinks       Allergies: Allergies   Allergen Reactions    Penicillins Other (See Comments)     headache  HA           Review of Systems       Review of Systems   Constitutional:  Negative for chills and fever. Respiratory:  Negative for shortness of breath. Cardiovascular:  Negative for chest pain. Gastrointestinal:  Negative for abdominal pain, diarrhea, nausea and vomiting. Skin:  Negative for rash. Neurological:  Positive for light-headedness. All other systems reviewed and are negative. Physical Exam   /83   Pulse 79   Temp 98.2 °F (36.8 °C) (Oral)   Resp 16   Ht 6' 4\" (1.93 m)   Wt 255 lb (115.7 kg)   SpO2 97%   BMI 31.04 kg/m²       Physical Exam  Vitals and nursing note reviewed. Constitutional:       General: He is not in acute distress. Appearance: Normal appearance. He is not ill-appearing, toxic-appearing or diaphoretic. HENT:      Head: Normocephalic and atraumatic. Cardiovascular:      Rate and Rhythm: Normal rate and regular rhythm. Pulmonary:      Effort: Pulmonary effort is normal.      Breath sounds: Normal breath sounds. Musculoskeletal:         General: Normal range of motion. Cervical back: Normal range of motion and neck supple. Skin:     General: Skin is warm. Findings: No rash. Neurological:      General: No focal deficit present. Mental Status: He is alert and oriented to person, place, and time. Cranial Nerves: Cranial nerves 2-12 are intact. No cranial nerve deficit. Sensory: Sensation is intact.  No sensory

## 2023-06-05 NOTE — ED TRIAGE NOTES
Patient presents with dizziness for three days. Patient states that when he stands it feels like the room around him is spinning causing him to sit down.

## 2023-06-06 LAB
EKG ATRIAL RATE: 62 BPM
EKG DIAGNOSIS: NORMAL
EKG P AXIS: 57 DEGREES
EKG P-R INTERVAL: 160 MS
EKG Q-T INTERVAL: 386 MS
EKG QRS DURATION: 80 MS
EKG QTC CALCULATION (BAZETT): 391 MS
EKG R AXIS: 50 DEGREES
EKG T AXIS: 60 DEGREES
EKG VENTRICULAR RATE: 62 BPM

## 2023-06-06 PROCEDURE — 93010 ELECTROCARDIOGRAM REPORT: CPT | Performed by: INTERNAL MEDICINE

## 2023-08-28 DIAGNOSIS — F41.8 OTHER SPECIFIED ANXIETY DISORDERS: ICD-10-CM

## 2023-08-28 DIAGNOSIS — F33.1 MAJOR DEPRESSIVE DISORDER, RECURRENT, MODERATE (HCC): ICD-10-CM

## 2023-08-28 DIAGNOSIS — G31.84 COGNITIVE IMPAIRMENT, MILD, SO STATED: ICD-10-CM

## 2023-08-30 RX ORDER — BUPROPION HYDROCHLORIDE 150 MG/1
150 TABLET ORAL EVERY MORNING
Qty: 30 TABLET | Refills: 1 | Status: SHIPPED | OUTPATIENT
Start: 2023-08-30

## 2023-08-31 NOTE — TELEPHONE ENCOUNTER
Attempted to contacted patient to inform of medication refill, no answer, left message to  at Pharmacy.

## 2024-01-15 DIAGNOSIS — F41.8 OTHER SPECIFIED ANXIETY DISORDERS: ICD-10-CM

## 2024-01-15 DIAGNOSIS — F33.1 MAJOR DEPRESSIVE DISORDER, RECURRENT, MODERATE (HCC): ICD-10-CM

## 2024-01-15 DIAGNOSIS — G31.84 COGNITIVE IMPAIRMENT, MILD, SO STATED: ICD-10-CM

## 2024-01-15 RX ORDER — BUPROPION HYDROCHLORIDE 150 MG/1
150 TABLET ORAL EVERY MORNING
Qty: 90 TABLET | Refills: 0 | Status: SHIPPED | OUTPATIENT
Start: 2024-01-15

## 2024-04-16 ENCOUNTER — HOSPITAL ENCOUNTER (EMERGENCY)
Facility: HOSPITAL | Age: 49
Discharge: HOME | End: 2024-04-16
Attending: STUDENT IN AN ORGANIZED HEALTH CARE EDUCATION/TRAINING PROGRAM
Payer: MEDICARE

## 2024-04-16 VITALS
RESPIRATION RATE: 18 BRPM | OXYGEN SATURATION: 99 % | BODY MASS INDEX: 25.82 KG/M2 | DIASTOLIC BLOOD PRESSURE: 113 MMHG | TEMPERATURE: 99.1 F | HEART RATE: 97 BPM | SYSTOLIC BLOOD PRESSURE: 164 MMHG | WEIGHT: 212 LBS | HEIGHT: 76 IN

## 2024-04-16 DIAGNOSIS — K64.4 EXTERNAL HEMORRHOID, BLEEDING: Primary | ICD-10-CM

## 2024-04-16 DIAGNOSIS — R03.0 ELEVATED BLOOD PRESSURE READING: ICD-10-CM

## 2024-04-16 PROCEDURE — 99283 EMERGENCY DEPT VISIT LOW MDM: CPT

## 2024-04-16 RX ORDER — POLYETHYLENE GLYCOL 3350 17 G/17G
17 POWDER, FOR SOLUTION ORAL DAILY
Qty: 510 G | Refills: 0 | Status: SHIPPED | OUTPATIENT
Start: 2024-04-16 | End: 2024-05-16

## 2024-04-16 RX ORDER — BENZOCAINE/MENTHOL 6 MG-10 MG
LOZENGE MUCOUS MEMBRANE
Qty: 30 G | Refills: 1 | Status: SHIPPED | OUTPATIENT
Start: 2024-04-16 | End: 2024-04-23

## 2024-04-16 ASSESSMENT — ENCOUNTER SYMPTOMS
EYE PAIN: 0
SORE THROAT: 0
BLOOD IN STOOL: 1
SHORTNESS OF BREATH: 0
ABDOMINAL PAIN: 0
RECTAL PAIN: 0

## 2024-04-16 ASSESSMENT — PAIN - FUNCTIONAL ASSESSMENT: PAIN_FUNCTIONAL_ASSESSMENT: NONE - DENIES PAIN

## 2024-04-16 NOTE — ED TRIAGE NOTES
PATIENT PRESENTED TO THE EMERGENCY DEPT WITH A COMPLAINT OF BLEED IN STOOL. PATIENT REPORTS USING THE RESTROOM TWICE THIS MORNING WHEN HE NOTICE BRIGHT RED BLOOD IN TOILET AND ON TOILET PAPER    PATIENT STATES THAT STOOL IS BROWN AND JUST NOTES WHAT LOOK LIKE BRIGHT RED BLOOD ON THE SIDE OF COMMODE.     PATIENT DENIES LONG TERM USE OF BLOOD THINNER OR NSAIDS      PATIENT ALERT AND ORIENTED X 4, PATIENT BREATHES FREELY ON ROOM AIR IN NIL CARDIOPULMOANRY DISTRESS

## 2024-04-16 NOTE — ED PROVIDER NOTES
Emergency Department Treatment Report    Patient: Ananth Camilo Age: 49 y.o. Sex: male    YOB: 1975 Admit Date: 4/16/2024 PCP: Akbar Noble MD   MRN: [id number]  CSN: [id number]     Room: [address] [address] Time Dictated: 10:43 AM        Chief Complaint   Chief Complaint   Patient presents with    Rectal Bleeding       History of Present Illness   Ananth Camilo is a 49 y.o. male with past medical history of hypertension, hyperlipidemia, type 2 diabetes mellitus who presents to the ED with the chief complaint of rectal bleeding.     Patient reports that with a bowel movement this morning he had bright red blood in the toilet, small-volume and bright red blood with wiping.  No similar symptoms in the past.  He denies any straining, reports his bowel movement felt normal.  No rectal foreign bodies.  No abdominal pain.  He denies any other history of easy bleeding or bruising, no hematuria.     No prior known colonoscopies.  No known family history of colorectal cancer or inflammatory bowel disease.  No known history of cirrhosis/liver disease.    No chest pain, no shortness of breath, no nausea, no vomiting.    Reports smokes Black and milds, occasional marijuana use, regular alcohol use 1 to 2, 24 ounces per day, no other drug use.    Reviewed medication list-bupropion, atorvastatin, amlodipine, metformin, states he uses aspirin very rarely.  No other NSAID use.    Last laboratory analysis reviewed June 2023-normal renal function and electrolytes, normal AST/ALT, normal platelets.    Review of Systems   Review of Systems   Constitutional:  Negative for fever.   HENT:  Negative for sore throat.    Eyes:  Negative for pain and visual disturbance.   Respiratory:  Negative for shortness of breath.         Wheezing and cough unchanged from baseline.    Cardiovascular:  Negative for chest pain.   Gastrointestinal:  Positive for blood in stool. Negative for abdominal pain and rectal pain.   Genitourinary:  Negative for

## 2024-04-16 NOTE — DISCHARGE INSTRUCTIONS
Thank you for choosing [facility] for emergency care.  You came in today for bleeding from your rectum.    Luckily, based on your exam we did see the area of bleeding from a hemorrhoid.    It is still very very important for you to follow-up with your primary care provider and call the number above to schedule a colonoscopy.  Please also discuss your elevated blood pressure with your provider.    We do still expect for you to have some bleeding from the site as it continues to heal.  Please use the hydrocortisone cream on the hemorrhoid twice a day for 7 days.    Please also start doing sitz baths, which is sitting in a few inches of warm water for about 10 minutes at least twice a day, but at least after every bowel movement.      Please return to the emergency room if you develop any new or concerning symptoms, severe or worsening pain, chest pain, difficulty breathing, lightheadedness, feeling going to pass out, significant increase worsening bleeding, severe headache, weakness, numbness.

## 2024-04-21 DIAGNOSIS — F33.1 MAJOR DEPRESSIVE DISORDER, RECURRENT, MODERATE (HCC): ICD-10-CM

## 2024-04-21 DIAGNOSIS — G31.84 COGNITIVE IMPAIRMENT, MILD, SO STATED: ICD-10-CM

## 2024-04-21 DIAGNOSIS — F41.8 OTHER SPECIFIED ANXIETY DISORDERS: ICD-10-CM

## 2024-04-25 RX ORDER — BUPROPION HYDROCHLORIDE 150 MG/1
150 TABLET ORAL EVERY MORNING
Qty: 30 TABLET | Refills: 0 | Status: SHIPPED | OUTPATIENT
Start: 2024-04-25

## 2024-05-28 DIAGNOSIS — F33.1 MAJOR DEPRESSIVE DISORDER, RECURRENT, MODERATE (HCC): ICD-10-CM

## 2024-05-28 DIAGNOSIS — G31.84 COGNITIVE IMPAIRMENT, MILD, SO STATED: ICD-10-CM

## 2024-05-28 DIAGNOSIS — F41.8 OTHER SPECIFIED ANXIETY DISORDERS: ICD-10-CM

## 2024-05-29 RX ORDER — BUPROPION HYDROCHLORIDE 150 MG/1
150 TABLET ORAL EVERY MORNING
Qty: 90 TABLET | Refills: 0 | Status: SHIPPED | OUTPATIENT
Start: 2024-05-29

## 2024-09-01 DIAGNOSIS — G31.84 COGNITIVE IMPAIRMENT, MILD, SO STATED: ICD-10-CM

## 2024-09-01 DIAGNOSIS — F41.8 OTHER SPECIFIED ANXIETY DISORDERS: ICD-10-CM

## 2024-09-01 DIAGNOSIS — F33.1 MAJOR DEPRESSIVE DISORDER, RECURRENT, MODERATE (HCC): ICD-10-CM

## 2024-09-03 RX ORDER — BUPROPION HYDROCHLORIDE 150 MG/1
150 TABLET ORAL EVERY MORNING
Qty: 30 TABLET | Refills: 0 | Status: SHIPPED | OUTPATIENT
Start: 2024-09-03

## 2024-09-17 DIAGNOSIS — F33.1 MAJOR DEPRESSIVE DISORDER, RECURRENT, MODERATE (HCC): ICD-10-CM

## 2024-09-17 DIAGNOSIS — F41.8 OTHER SPECIFIED ANXIETY DISORDERS: ICD-10-CM

## 2024-09-17 DIAGNOSIS — G31.84 COGNITIVE IMPAIRMENT, MILD, SO STATED: ICD-10-CM

## 2024-09-22 RX ORDER — BUPROPION HYDROCHLORIDE 150 MG/1
150 TABLET ORAL EVERY MORNING
Qty: 56 TABLET | Refills: 0 | Status: SHIPPED | OUTPATIENT
Start: 2024-09-22